# Patient Record
Sex: FEMALE | Race: BLACK OR AFRICAN AMERICAN | NOT HISPANIC OR LATINO | Employment: FULL TIME | URBAN - METROPOLITAN AREA
[De-identification: names, ages, dates, MRNs, and addresses within clinical notes are randomized per-mention and may not be internally consistent; named-entity substitution may affect disease eponyms.]

---

## 2018-10-25 ENCOUNTER — OFFICE VISIT CONVERTED (OUTPATIENT)
Dept: FAMILY MEDICINE CLINIC | Facility: CLINIC | Age: 23
End: 2018-10-25
Attending: NURSE PRACTITIONER

## 2018-11-21 ENCOUNTER — OFFICE VISIT CONVERTED (OUTPATIENT)
Dept: FAMILY MEDICINE CLINIC | Facility: CLINIC | Age: 23
End: 2018-11-21
Attending: NURSE PRACTITIONER

## 2019-08-06 ENCOUNTER — HOSPITAL ENCOUNTER (OUTPATIENT)
Dept: DIABETES SERVICES | Facility: HOSPITAL | Age: 24
Discharge: HOME | End: 2019-08-06
Attending: OBSTETRICS & GYNECOLOGY

## 2019-09-16 ENCOUNTER — HOSPITAL ENCOUNTER (OUTPATIENT)
Dept: LABOR AND DELIVERY | Facility: HOSPITAL | Age: 24
Discharge: HOME | End: 2019-09-16
Attending: OBSTETRICS & GYNECOLOGY

## 2019-10-24 ENCOUNTER — HOSPITAL ENCOUNTER (OUTPATIENT)
Dept: URGENT CARE | Facility: CLINIC | Age: 24
Discharge: HOME | End: 2019-10-24
Attending: EMERGENCY MEDICINE

## 2019-11-11 ENCOUNTER — HOSPITAL ENCOUNTER (OUTPATIENT)
Dept: PERIOP | Facility: HOSPITAL | Age: 24
Setting detail: HOSPITAL OUTPATIENT SURGERY
Discharge: HOME | End: 2019-11-11
Attending: OBSTETRICS & GYNECOLOGY

## 2019-11-11 LAB — HCG UR QL: NEGATIVE

## 2019-11-21 ENCOUNTER — CONVERSION ENCOUNTER (OUTPATIENT)
Dept: FAMILY MEDICINE CLINIC | Facility: CLINIC | Age: 24
End: 2019-11-21

## 2019-11-21 ENCOUNTER — OFFICE VISIT CONVERTED (OUTPATIENT)
Dept: FAMILY MEDICINE CLINIC | Facility: CLINIC | Age: 24
End: 2019-11-21
Attending: NURSE PRACTITIONER

## 2019-11-21 ENCOUNTER — HOSPITAL ENCOUNTER (OUTPATIENT)
Dept: FAMILY MEDICINE CLINIC | Facility: CLINIC | Age: 24
Discharge: HOME | End: 2019-11-21
Attending: NURSE PRACTITIONER

## 2019-11-21 LAB
ALBUMIN SERPL-MCNC: 4.5 G/DL (ref 3.5–5)
ALBUMIN/GLOB SERPL: 1.2 {RATIO} (ref 1.4–2.6)
ALP SERPL-CCNC: 72 U/L (ref 42–98)
ALT SERPL-CCNC: 15 U/L (ref 10–40)
AMYLASE SERPL-CCNC: 86 U/L (ref 30–110)
ANION GAP SERPL CALC-SCNC: 17 MMOL/L (ref 8–19)
AST SERPL-CCNC: 18 U/L (ref 15–50)
BASOPHILS # BLD AUTO: 0.03 10*3/UL (ref 0–0.2)
BASOPHILS NFR BLD AUTO: 0.7 % (ref 0–3)
BILIRUB SERPL-MCNC: 1.04 MG/DL (ref 0.2–1.3)
BUN SERPL-MCNC: 10 MG/DL (ref 5–25)
BUN/CREAT SERPL: 13 {RATIO} (ref 6–20)
CALCIUM SERPL-MCNC: 9.9 MG/DL (ref 8.7–10.4)
CHLORIDE SERPL-SCNC: 101 MMOL/L (ref 99–111)
CONV ABS IMM GRAN: 0.01 10*3/UL (ref 0–0.2)
CONV CO2: 27 MMOL/L (ref 22–32)
CONV IMMATURE GRAN: 0.2 % (ref 0–1.8)
CONV TOTAL PROTEIN: 8.2 G/DL (ref 6.3–8.2)
CREAT UR-MCNC: 0.78 MG/DL (ref 0.5–0.9)
DEPRECATED RDW RBC AUTO: 46.5 FL (ref 36.4–46.3)
EOSINOPHIL # BLD AUTO: 0.1 10*3/UL (ref 0–0.7)
EOSINOPHIL # BLD AUTO: 2.3 % (ref 0–7)
ERYTHROCYTE [DISTWIDTH] IN BLOOD BY AUTOMATED COUNT: 14.4 % (ref 11.7–14.4)
GFR SERPLBLD BASED ON 1.73 SQ M-ARVRAT: >60 ML/MIN/{1.73_M2}
GLOBULIN UR ELPH-MCNC: 3.7 G/DL (ref 2–3.5)
GLUCOSE SERPL-MCNC: 80 MG/DL (ref 65–99)
HCT VFR BLD AUTO: 44.3 % (ref 37–47)
HGB BLD-MCNC: 13.8 G/DL (ref 12–16)
LIPASE SERPL-CCNC: 18 U/L (ref 5–51)
LYMPHOCYTES # BLD AUTO: 1.53 10*3/UL (ref 1–5)
LYMPHOCYTES NFR BLD AUTO: 34.7 % (ref 20–45)
MCH RBC QN AUTO: 27.7 PG (ref 27–31)
MCHC RBC AUTO-ENTMCNC: 31.2 G/DL (ref 33–37)
MCV RBC AUTO: 89 FL (ref 81–99)
MONOCYTES # BLD AUTO: 0.24 10*3/UL (ref 0.2–1.2)
MONOCYTES NFR BLD AUTO: 5.4 % (ref 3–10)
NEUTROPHILS # BLD AUTO: 2.5 10*3/UL (ref 2–8)
NEUTROPHILS NFR BLD AUTO: 56.7 % (ref 30–85)
NRBC CBCN: 0 % (ref 0–0.7)
OSMOLALITY SERPL CALC.SUM OF ELEC: 290 MOSM/KG (ref 273–304)
PLATELET # BLD AUTO: 220 10*3/UL (ref 130–400)
PMV BLD AUTO: 12.2 FL (ref 9.4–12.3)
POTASSIUM SERPL-SCNC: 3.7 MMOL/L (ref 3.5–5.3)
RBC # BLD AUTO: 4.98 10*6/UL (ref 4.2–5.4)
SODIUM SERPL-SCNC: 141 MMOL/L (ref 135–147)
WBC # BLD AUTO: 4.41 10*3/UL (ref 4.8–10.8)

## 2019-12-04 ENCOUNTER — HOSPITAL ENCOUNTER (OUTPATIENT)
Dept: ULTRASOUND IMAGING | Facility: HOSPITAL | Age: 24
Discharge: HOME | End: 2019-12-04
Attending: NURSE PRACTITIONER

## 2019-12-05 ENCOUNTER — OFFICE VISIT CONVERTED (OUTPATIENT)
Dept: SURGERY | Facility: CLINIC | Age: 24
End: 2019-12-05
Attending: SURGERY

## 2019-12-05 ENCOUNTER — HOSPITAL ENCOUNTER (OUTPATIENT)
Dept: URGENT CARE | Facility: CLINIC | Age: 24
Discharge: HOME | End: 2019-12-05
Attending: EMERGENCY MEDICINE

## 2019-12-05 ENCOUNTER — CONVERSION ENCOUNTER (OUTPATIENT)
Dept: SURGERY | Facility: CLINIC | Age: 24
End: 2019-12-05

## 2019-12-13 ENCOUNTER — HOSPITAL ENCOUNTER (OUTPATIENT)
Dept: PERIOP | Facility: HOSPITAL | Age: 24
Setting detail: HOSPITAL OUTPATIENT SURGERY
Discharge: HOME | End: 2019-12-13
Attending: SURGERY

## 2019-12-16 ENCOUNTER — CONVERSION ENCOUNTER (OUTPATIENT)
Dept: UROLOGY | Facility: CLINIC | Age: 24
End: 2019-12-16

## 2019-12-16 ENCOUNTER — OFFICE VISIT CONVERTED (OUTPATIENT)
Dept: UROLOGY | Facility: CLINIC | Age: 24
End: 2019-12-16
Attending: UROLOGY

## 2020-01-02 ENCOUNTER — OFFICE VISIT CONVERTED (OUTPATIENT)
Dept: SURGERY | Facility: CLINIC | Age: 25
End: 2020-01-02
Attending: SURGERY

## 2020-01-02 ENCOUNTER — CONVERSION ENCOUNTER (OUTPATIENT)
Dept: SURGERY | Facility: CLINIC | Age: 25
End: 2020-01-02

## 2020-01-16 ENCOUNTER — CONVERSION ENCOUNTER (OUTPATIENT)
Dept: FAMILY MEDICINE CLINIC | Facility: CLINIC | Age: 25
End: 2020-01-16

## 2020-01-16 ENCOUNTER — OFFICE VISIT CONVERTED (OUTPATIENT)
Dept: FAMILY MEDICINE CLINIC | Facility: CLINIC | Age: 25
End: 2020-01-16
Attending: NURSE PRACTITIONER

## 2021-01-13 ENCOUNTER — HOSPITAL ENCOUNTER (OUTPATIENT)
Dept: FAMILY MEDICINE CLINIC | Facility: CLINIC | Age: 26
Discharge: HOME | End: 2021-01-13
Attending: NURSE PRACTITIONER

## 2021-01-13 LAB
25(OH)D3 SERPL-MCNC: 33.7 NG/ML (ref 30–100)
ALBUMIN SERPL-MCNC: 4.1 G/DL (ref 3.5–5)
ALBUMIN/GLOB SERPL: 1.3 {RATIO} (ref 1.4–2.6)
ALP SERPL-CCNC: 48 U/L (ref 42–98)
ALT SERPL-CCNC: 20 U/L (ref 10–40)
ANION GAP SERPL CALC-SCNC: 13 MMOL/L (ref 8–19)
AST SERPL-CCNC: 19 U/L (ref 15–50)
BASOPHILS # BLD AUTO: 0.02 10*3/UL (ref 0–0.2)
BASOPHILS # BLD: 1 % (ref 0–3)
BASOPHILS NFR BLD AUTO: 0.7 % (ref 0–3)
BILIRUB SERPL-MCNC: 0.87 MG/DL (ref 0.2–1.3)
BUN SERPL-MCNC: 14 MG/DL (ref 5–25)
BUN/CREAT SERPL: 17 {RATIO} (ref 6–20)
CALCIUM SERPL-MCNC: 8.9 MG/DL (ref 8.7–10.4)
CHLORIDE SERPL-SCNC: 106 MMOL/L (ref 99–111)
CHOLEST SERPL-MCNC: 142 MG/DL (ref 107–200)
CHOLEST/HDLC SERPL: 2 {RATIO} (ref 3–6)
CONV ABS BANDS: 0 % (ref 1–5)
CONV ABS IMM GRAN: 0.01 10*3/UL (ref 0–0.2)
CONV ANISOCYTES: SLIGHT
CONV ATYPICAL LYMPHOCYTES: 6 % (ref 0–5)
CONV CO2: 26 MMOL/L (ref 22–32)
CONV HYPOCHROMIA IN BLOOD BY LIGHT MICROSCOPY: SLIGHT
CONV IMMATURE GRAN: 0.4 % (ref 0–1.8)
CONV SEGMENTED NEUTROPHILS: 46 % (ref 45–70)
CONV TOTAL PROTEIN: 7.2 G/DL (ref 6.3–8.2)
CREAT UR-MCNC: 0.83 MG/DL (ref 0.5–0.9)
DEPRECATED RDW RBC AUTO: 44 FL (ref 36.4–46.3)
EOSINOPHIL # BLD AUTO: 0.18 10*3/UL (ref 0–0.7)
EOSINOPHIL # BLD AUTO: 6.3 % (ref 0–7)
EOSINOPHIL NFR BLD AUTO: 3 % (ref 0–7)
ERYTHROCYTE [DISTWIDTH] IN BLOOD BY AUTOMATED COUNT: 13.4 % (ref 11.7–14.4)
GFR SERPLBLD BASED ON 1.73 SQ M-ARVRAT: >60 ML/MIN/{1.73_M2}
GLOBULIN UR ELPH-MCNC: 3.1 G/DL (ref 2–3.5)
GLUCOSE SERPL-MCNC: 81 MG/DL (ref 65–99)
HCT VFR BLD AUTO: 40.1 % (ref 37–47)
HDLC SERPL-MCNC: 71 MG/DL (ref 40–60)
HGB BLD-MCNC: 12.8 G/DL (ref 12–16)
LARGE PLATELETS: SLIGHT
LDLC SERPL CALC-MCNC: 62 MG/DL (ref 70–100)
LYMPHOCYTES # BLD AUTO: 1.09 10*3/UL (ref 1–5)
LYMPHOCYTES NFR BLD AUTO: 38.4 % (ref 20–45)
MCH RBC QN AUTO: 28.4 PG (ref 27–31)
MCHC RBC AUTO-ENTMCNC: 31.9 G/DL (ref 33–37)
MCV RBC AUTO: 89.1 FL (ref 81–99)
MONOCYTES # BLD AUTO: 0.16 10*3/UL (ref 0.2–1.2)
MONOCYTES NFR BLD AUTO: 5.6 % (ref 3–10)
MONOCYTES NFR BLD MANUAL: 11 % (ref 3–10)
NEUTROPHILS # BLD AUTO: 1.38 10*3/UL (ref 2–8)
NEUTROPHILS NFR BLD AUTO: 48.6 % (ref 30–85)
NRBC CBCN: 0 % (ref 0–0.7)
NUC CELL # PRT MANUAL: 0 /100{WBCS}
OSMOLALITY SERPL CALC.SUM OF ELEC: 292 MOSM/KG (ref 273–304)
OVALOCYTES BLD QL SMEAR: ABNORMAL
PLAT MORPH BLD: NORMAL
PLATELET # BLD AUTO: 150 10*3/UL (ref 130–400)
PMV BLD AUTO: 11.6 FL (ref 9.4–12.3)
POTASSIUM SERPL-SCNC: 4.3 MMOL/L (ref 3.5–5.3)
RBC # BLD AUTO: 4.5 10*6/UL (ref 4.2–5.4)
SMALL PLATELETS BLD QL SMEAR: ADEQUATE
SODIUM SERPL-SCNC: 141 MMOL/L (ref 135–147)
SPHEROCYTES BLD QL SMEAR: ABNORMAL
T4 FREE SERPL-MCNC: 1 NG/DL (ref 0.9–1.8)
TRIGL SERPL-MCNC: 45 MG/DL (ref 40–150)
TSH SERPL-ACNC: 1.49 M[IU]/L (ref 0.27–4.2)
VARIANT LYMPHS NFR BLD MANUAL: 33 % (ref 20–45)
VLDLC SERPL-MCNC: 9 MG/DL (ref 5–37)
WBC # BLD AUTO: 2.84 10*3/UL (ref 4.8–10.8)

## 2021-01-18 ENCOUNTER — OFFICE VISIT CONVERTED (OUTPATIENT)
Dept: FAMILY MEDICINE CLINIC | Facility: CLINIC | Age: 26
End: 2021-01-18
Attending: NURSE PRACTITIONER

## 2021-01-18 ENCOUNTER — HOSPITAL ENCOUNTER (OUTPATIENT)
Dept: FAMILY MEDICINE CLINIC | Facility: CLINIC | Age: 26
Discharge: HOME | End: 2021-01-18
Attending: NURSE PRACTITIONER

## 2021-01-18 LAB
BASOPHILS # BLD AUTO: 0.02 10*3/UL (ref 0–0.2)
BASOPHILS NFR BLD AUTO: 0.7 % (ref 0–3)
CONV ABS IMM GRAN: 0 10*3/UL (ref 0–0.2)
CONV IMMATURE GRAN: 0 % (ref 0–1.8)
DEPRECATED RDW RBC AUTO: 44 FL (ref 36.4–46.3)
EOSINOPHIL # BLD AUTO: 0.18 10*3/UL (ref 0–0.7)
EOSINOPHIL # BLD AUTO: 6.5 % (ref 0–7)
ERYTHROCYTE [DISTWIDTH] IN BLOOD BY AUTOMATED COUNT: 13.4 % (ref 11.7–14.4)
HCT VFR BLD AUTO: 41.4 % (ref 37–47)
HGB BLD-MCNC: 13.3 G/DL (ref 12–16)
LYMPHOCYTES # BLD AUTO: 1.12 10*3/UL (ref 1–5)
LYMPHOCYTES NFR BLD AUTO: 40.6 % (ref 20–45)
MCH RBC QN AUTO: 28.6 PG (ref 27–31)
MCHC RBC AUTO-ENTMCNC: 32.1 G/DL (ref 33–37)
MCV RBC AUTO: 89 FL (ref 81–99)
MONOCYTES # BLD AUTO: 0.19 10*3/UL (ref 0.2–1.2)
MONOCYTES NFR BLD AUTO: 6.9 % (ref 3–10)
NEUTROPHILS # BLD AUTO: 1.25 10*3/UL (ref 2–8)
NEUTROPHILS NFR BLD AUTO: 45.3 % (ref 30–85)
NRBC CBCN: 0 % (ref 0–0.7)
PLATELET # BLD AUTO: 170 10*3/UL (ref 130–400)
PMV BLD AUTO: 11.1 FL (ref 9.4–12.3)
RBC # BLD AUTO: 4.65 10*6/UL (ref 4.2–5.4)
WBC # BLD AUTO: 2.76 10*3/UL (ref 4.8–10.8)

## 2021-02-01 ENCOUNTER — OFFICE VISIT CONVERTED (OUTPATIENT)
Dept: ONCOLOGY | Facility: HOSPITAL | Age: 26
End: 2021-02-01
Attending: INTERNAL MEDICINE

## 2021-02-01 ENCOUNTER — HOSPITAL ENCOUNTER (OUTPATIENT)
Dept: ONCOLOGY | Facility: HOSPITAL | Age: 26
Discharge: HOME | End: 2021-02-01
Attending: INTERNAL MEDICINE

## 2021-02-01 LAB
BASOPHILS # BLD AUTO: 0.02 10*3/UL (ref 0–0.2)
BASOPHILS NFR BLD AUTO: 0.6 % (ref 0–3)
CONV ABS IMM GRAN: 0 10*3/UL (ref 0–0.2)
CONV IMMATURE GRAN: 0 % (ref 0–1.8)
DEPRECATED RDW RBC AUTO: 42.1 FL (ref 36.4–46.3)
EOSINOPHIL # BLD AUTO: 0.25 10*3/UL (ref 0–0.7)
EOSINOPHIL # BLD AUTO: 7.2 % (ref 0–7)
ERYTHROCYTE [DISTWIDTH] IN BLOOD BY AUTOMATED COUNT: 13.1 % (ref 11.7–14.4)
HCT VFR BLD AUTO: 39.4 % (ref 37–47)
HGB BLD-MCNC: 12.7 G/DL (ref 12–16)
LYMPHOCYTES # BLD AUTO: 1.03 10*3/UL (ref 1–5)
LYMPHOCYTES NFR BLD AUTO: 29.8 % (ref 20–45)
MCH RBC QN AUTO: 28.3 PG (ref 27–31)
MCHC RBC AUTO-ENTMCNC: 32.2 G/DL (ref 33–37)
MCV RBC AUTO: 87.9 FL (ref 81–99)
MONOCYTES # BLD AUTO: 0.24 10*3/UL (ref 0.2–1.2)
MONOCYTES NFR BLD AUTO: 6.9 % (ref 3–10)
NEUTROPHILS # BLD AUTO: 1.92 10*3/UL (ref 2–8)
NEUTROPHILS NFR BLD AUTO: 55.5 % (ref 30–85)
NRBC CBCN: 0 % (ref 0–0.7)
PLATELET # BLD AUTO: 176 10*3/UL (ref 130–400)
PMV BLD AUTO: 11.2 FL (ref 9.4–12.3)
RBC # BLD AUTO: 4.48 10*6/UL (ref 4.2–5.4)
WBC # BLD AUTO: 3.46 10*3/UL (ref 4.8–10.8)

## 2021-05-14 VITALS
BODY MASS INDEX: 27.13 KG/M2 | HEART RATE: 93 BPM | DIASTOLIC BLOOD PRESSURE: 72 MMHG | OXYGEN SATURATION: 100 % | WEIGHT: 153.12 LBS | SYSTOLIC BLOOD PRESSURE: 110 MMHG | TEMPERATURE: 98.2 F | HEIGHT: 63 IN

## 2021-05-14 NOTE — PROGRESS NOTES
Progress Note      Patient Name: Jhoana Garcia   Patient ID: [id number]   Sex: Female   YOB: 1995    Primary Care Provider: Yi MOSLEY   Referring Provider: Yi MOSLEY    Visit Date: January 18, 2021    Provider: DIMITRI Nelson   Location: [facility]   Location Address: [address] [place] [address], [address] [place] [address]  [id number]   Location Phone: [phone]          Chief Complaint  · Annual physical exam      History Of Present Illness  Jhoana Garcia is a 25 year old /Black,  or  female who presents for evaluation and treatment of:      Patient is a 25-year-old female who comes in for follow-up after she got lab work done.  She has no complaints, blood pressure is good today at 110/72 she denies any headache, chest pain or change in vision.  She states overall she is feeling well, she states it did just been over a year since she her last labs and wanted to make sure things looked okay.  Overall her labs looked well I did discuss with patient that her white count is 2.8 we like it below 4.8, will repeat CBC today discussed with patient if remains low we will likely send over to hematology if it is improved we will recheck in 1 month.  Patient verbalized understanding.  Otherwise no other complaints at this time.       Past Medical History  Disease Name Date Onset Notes   ***No Significant Medical History --  --    Allergy, Unspecified --  --    Birth control counseling --  --    Gallstones --  --          Past Surgical History  Procedure Name Date Notes   Gallbladder removal --  --    Tubal ligation --  --    [place] Tooth Extraction --  --          Allergy List  Allergen Name Date Reaction Notes   amoxicillin --  --  --    PENICILLINS --  --  --    Septra --  --  --        Allergies Reconciled  Family Medical History  Disease Name Relative/Age Notes   Hypertension  --    Diabetes, unspecified type Grandmother  "(maternal)/   Grandmother (maternal)         Social History  Finding Status Start/Stop Quantity Notes   Alcohol Current some day --/-- --  12/19/2016 - 11/21/2016 -    Tobacco Former 20/-- 1-2 qdaily black and milds         Immunizations  NameDate Admin Mfg Trade Name Lot Number Route Inj VIS Given VIS Publication   HPV05/17/2017 MSD Gardasil 9 [id number] IM RD 05/17/2017 12/02/2016   Comments: Patient tolerated inj well and left the office in stable condition.   HPV01/23/2017 MSD Gardasil 9 [id number] IM LD 01/23/2017 03/31/2016   Comments: Pt tolerated injection well. left in stable condition.   HPV11/21/2016 MSD Gardasil 9 [id number] IM  11/21/2016 05/17/2013   Comments: tolerated well   InfluenzaRefused 01/18/2021 NE Not Entered  NE NE     Comments: pt declines         Review of Systems  · Constitutional  o Denies  o : fever, fatigue, weight loss, weight gain  · Eyes  o Denies  o : impaired vision, blurred vision, changes in vision  · HENT  o Denies  o : headaches, vertigo, lightheadedness  · Cardiovascular  o Denies  o : lower extremity edema, claudication, chest pressure, palpitations  · Respiratory  o Denies  o : shortness of breath, wheezing, cough, hemoptysis, dyspnea on exertion  · Gastrointestinal  o Denies  o : nausea, vomiting, diarrhea, constipation, abdominal pain  · Integument  o Denies  o : rash, itching, pigmentation changes  · Musculoskeletal  o Denies  o : joint pain, joint swelling, muscle pain  · Psychiatric  o Denies  o : anxiety, depression, suicidal ideation, homicidal ideation      Vitals  Date Time BP Position Site L\R Cuff Size HR RR TEMP (F) WT  HT  BMI kg/m2 BSA m2 O2 Sat FR L/min FiO2        01/18/2021 09:22 /72 Sitting    93 - R  98.2 153lbs 2oz 5'  3\" 27.12 1.76 100 %            Physical Examination  · Constitutional  o Appearance  o : well-nourished, well developed, in no acute distress  · Eyes  o Conjunctivae  o : conjunctivae normal, no exudates present  o Sclerae  o : sclerae " white  o Pupils and Irises  o : pupils equal and round, and reactive to light and accomodation bilaterally  o Eyelids/Ocular Adnexae  o : extra ocular movements intact  · Respiratory  o Respiratory Effort  o : breathing unlabored, no accessory muscle use  o Inspection of Chest  o : normal appearance, no retractions  o Auscultation of Lungs  o : normal breath sounds bilaterally  · Cardiovascular  o Heart  o :   § Auscultation of Heart  § : regular rate and rhythm, no murmurs, gallops or rubs  o Peripheral Vascular System  o :   § Extremities  § : no edema  · Neurologic  o Mental Status Examination  o :   § Orientation  § : alert and oriented x3  § Speech/Language  § : normal speech pattern  o Cranial Nerves  o : II thru VII intact  o Gait and Station  o : normal gait, able to stand without difficulty  · Psychiatric  o Judgment and Insight  o : judgment and insight intact, judgement for everyday activities and social situations within normal limits, insight intact  o Thought Processes  o : rate of thoughts normal, thought content logical  o Mood and Affect  o : mood normal, affect appropriate              Assessment  · Annual physical exam     V70.0/Z00.00  · Leukopenia     288.50/D72.819  Will recheck labs today if remains low will refer over to hematology. Patient is agreeable treatment plan.      Plan  · Orders  o CBC with Auto Diff [facility] ([id number]) - 288.50/D72.819 - 01/18/2021  o ACO-14: Influenza immunization was not administered for reasons documented [facility] () - - 01/18/2021   pt declines  o ACO-39: Current medications updated and reviewed (0210F, ) - - 01/18/2021  · Medications  o Medications have been Reconciled  o Transition of Care or Provider Policy  · Instructions  o Reviewed health maintenance flowsheet and updated information. Orders were placed and/or patient's response was documented.  o Take all medications as prescribed/directed.  o Patient was educated/instructed on their diagnosis, treatment  and medications prior to discharge from the clinic today.  o Flu vaccine declined.  · Disposition  o Call or Return if symptoms worsen or persist.  o Follow up 1 year  o follow up as needed  o call the office with any questions or concerns            Electronically Signed by: DIMITRI Nelsno -Author on January 18, 2021 09:40:32 AM

## 2021-05-15 VITALS
DIASTOLIC BLOOD PRESSURE: 62 MMHG | WEIGHT: 167 LBS | BODY MASS INDEX: 29.59 KG/M2 | TEMPERATURE: 98.7 F | HEART RATE: 81 BPM | HEIGHT: 63 IN | SYSTOLIC BLOOD PRESSURE: 106 MMHG

## 2021-05-15 VITALS
HEIGHT: 63 IN | DIASTOLIC BLOOD PRESSURE: 58 MMHG | TEMPERATURE: 97.7 F | BODY MASS INDEX: 28.26 KG/M2 | OXYGEN SATURATION: 100 % | SYSTOLIC BLOOD PRESSURE: 114 MMHG | WEIGHT: 159.5 LBS | HEART RATE: 80 BPM

## 2021-05-15 VITALS
SYSTOLIC BLOOD PRESSURE: 118 MMHG | HEART RATE: 91 BPM | DIASTOLIC BLOOD PRESSURE: 62 MMHG | BODY MASS INDEX: 29.95 KG/M2 | WEIGHT: 169 LBS | HEIGHT: 63 IN | TEMPERATURE: 98.8 F | OXYGEN SATURATION: 100 %

## 2021-05-15 VITALS — RESPIRATION RATE: 14 BRPM | HEIGHT: 63 IN | WEIGHT: 167 LBS | BODY MASS INDEX: 29.59 KG/M2

## 2021-05-15 VITALS — BODY MASS INDEX: 29.23 KG/M2 | HEIGHT: 63 IN | RESPIRATION RATE: 14 BRPM | WEIGHT: 165 LBS

## 2021-05-16 VITALS
OXYGEN SATURATION: 99 % | HEIGHT: 63 IN | TEMPERATURE: 98.1 F | DIASTOLIC BLOOD PRESSURE: 60 MMHG | BODY MASS INDEX: 29.06 KG/M2 | SYSTOLIC BLOOD PRESSURE: 108 MMHG | HEART RATE: 75 BPM | WEIGHT: 164 LBS

## 2021-05-16 VITALS
OXYGEN SATURATION: 99 % | BODY MASS INDEX: 29.23 KG/M2 | HEIGHT: 63 IN | DIASTOLIC BLOOD PRESSURE: 64 MMHG | HEART RATE: 80 BPM | WEIGHT: 165 LBS | TEMPERATURE: 98.9 F | SYSTOLIC BLOOD PRESSURE: 118 MMHG

## 2021-05-28 VITALS
BODY MASS INDEX: 26.87 KG/M2 | HEART RATE: 82 BPM | DIASTOLIC BLOOD PRESSURE: 67 MMHG | OXYGEN SATURATION: 100 % | RESPIRATION RATE: 18 BRPM | TEMPERATURE: 97.6 F | WEIGHT: 151.68 LBS | SYSTOLIC BLOOD PRESSURE: 106 MMHG

## 2021-05-28 NOTE — PROGRESS NOTES
Patient: KATHLEEN TORRES     Acct: [id number]     Report: #[id number]  UNIT #: [id number]     : 1995    Encounter Date:2021  PRIMARY CARE: CHEY VIRGEN  ***Signed***  --------------------------------------------------------------------------------------------------------------------  NURSE INTAKE      Visit Type      New Patient Visit            Chief Complaint      ABNORMAL CBC            Referring Provider/Copies To      Primary Care Provider:  CHEY VIRGEN      Copies To:   CHEY VIRGEN            History and Present Illness      HPI - Hematology Interim      25-year-old  female sent for evaluation by PCP for leukopenia      Patient had CBC on 2021 white blood cell count was 2.84 hemoglobin     was 12.8 hematocrit 40.1 platelet count 150 granulocyte count was 1.38 and and s    ome atypical lymphocytes were noted       Lab was repeated on 2021 white blood cell count was 2.76 hemoglobin    13.3 hematocrit 41.4 platelet count of 170 granulocyte count was 1.25 atypical     lymphocytosis was no longer seen      Prior CBCs from 2019 revealed a normal white blood cell count by     history from the patient she reports at the time she had cholecystitis and was     pregnant       Repeat CBC after delivery on  revealed a white blood cell     count of 4.41 slightly low hemoglobin of 13.8 hematocrit of 44.3 and platelet     count of 220      Patient denies any history of frequent infections      Denies any cough or URI symptoms at present      Other pertinent history is that patient tested positive for Covid 19 in 2020.  Patient reports that she was asymptomatic and did not need hos    pitalization            Most Recent Lab Findings      Laboratory Tests      21 15:01            Labs were unremarkable with a normal liver function tests       normal TSH      Normal BUN and creatinine            PAST, FAMILY   Past  Medical History      Past Medical History:  None      Other Hematology History:        ABNORMAL CBC            Past Surgical History      Cholecystectomy            TUBAL            Family History      Family History:  No Family History            Social History      Marital Status:  Single      Lives independently:  Yes      Number of Children:  2            Tobacco Use      Tobacco status:  Current every day smoker      Smoking packs/day:  0.5            Alcohol Use      Alcohol intake:  0-2 drinks per day            Substance Use      Substance use:  Denies use            REVIEW OF SYSTEMS      General:  Denies: Appetite Change, Fatigue, Fever, Night Sweats, Weight Gain,     Weight Loss      Eye:  Denies Blurred Vision, Denies Corrective Lenses, Denies Diplopia, Denies     Vision Changes      ENT:  Denies Headache, Denies Hearing Loss, Denies Hoarseness, Denies Sore     Throat      Cardiovascular:  Denies Chest Pain, Denies Palpitations      Respiratory:  Denies: Cough, Coughing Blood, Productive Cough, Shortness of Air,    Wheezing      Gastrointestinal:  Denies Bloody Stools, Denies Constipation, Denies Diarrhea,     Denies Nausea/Vomiting, Denies Problem Swallowing, Denies Unable to Control     Bowels      Genitourinary:  Denies Blood in Urine, Denies Incontinence, Denies Painful     Urination      Musculoskeletal:  Denies Back Pain, Denies Muscle Pain, Denies Painful Joints      Integumentary:  Denies Itching, Denies Lesions, Denies Rash      Neurologic:  Denies Dizziness, Denies Numbness\Tingling, Denies Seizures      Psychiatric:  Denies Anxiety, Denies Depression      Endocrine:  Denies Cold Intolerance, Denies Heat Intolerance      Hematologic/Lymphatic:  Denies Bruising, Denies Bleeding, Denies Enlarged Lymph     Nodes      Reproductive:  Denies: Menopause, Heavy Periods, Pregnant, Still Menstruating            VITAL SIGNS AND SCORES      Vitals      Weight 151 lbs 10.824 oz / 68.8 kg      Temperature  97.6 F / 36.44 C - Temporal      Pulse 82      Respirations 18      Blood Pressure 106/67 Sitting, Left Arm      Pulse Oximetry 100%, ROOM AIR            Pain Score      Experiencing any pain?:  No      Pain Scale Used:  Numerical      Pain Intensity:  0            Fatigue Score      Experiencing any fatigue?:  No      Fatigue (0-10 scale):  0 (none)            EXAM      General Appearance:  Positive for: Alert, Oriented x3, Cooperative      Eye:  Positive for: Anicteric Sclerae      HEENT:  Positive for: Oropharynx clear      Respiratory:  Positive for: CTAB      Abdomen/Gastro:  Positive for: Normal Active Bowel Sounds      Cardiovascular:  Positive for: RRR      Skin:  Negative for: Rash      Psychiatric:  Positive for: AAO X 3      Musculoskeletal:  Positive for: Full ROM Lower Extremety      Lower Extremities:  Negative for: Edema      Lymphatic:  Negative for: Axillary            PREVENTION      Hx Influenza Vaccination:  No      2 or More Falls in Past Year?:  No      Fall Past Year with Injury?:  No      Hx Pneumococcal Vaccination:  No      Encouraged to follow-up with:  PCP regarding preventative exams.      Chart initiated by      MICHAEL BOLAÑOS            ALLERGY/MEDS      Allergies      Coded Allergies:             AMOXICILLIN (Verified  Allergy, Unknown, RASH, 2/1/21)           SULFAMETHOXAZOLE (Verified  Allergy, Unknown, RASH, 2/1/21)           TRIMETHOPRIM (Verified  Allergy, Unknown, RASH, 2/1/21)            Medications      Medications Reviewed:  No Changes made to meds            IMPRESSION/PLAN      Impression      Leukopenia            Diagnosis      Notes      Discontinued Medications      * HYDROcodone-Acetaminophen 5-325 Mg 1 EACH TABLET: 1-2 TAB PO Q4H PRN PAIN #20      * Docusate Sodium (Colace) 100 MG CAPSULE: 100 MG PO BID PRN CONSTIPATION #10      * ONDANSETRON ODT 4 MG TAB.RAPDIS: 4 MG PO Q6H PRN NAUSEA #10      New Diagnostics      * CBC With Auto Diff, Routine            Plan       Leukopenia      Likely leukopenia is related to benign leukopenia often seen in presence of      descent       Patient generally feels well without any symptoms of fatigue or weight loss and     without any symptoms of infection      There is no evidence that this is a primary bone marrow process given normal     differential on the CBC as well as her normal hemoglobin and platelet count and     a bone marrow aspirate and biopsy is not indicated at this time      Her absolute neutrophil count is well over 1000 and thus patient is not at risk     for any infections      Her prior CBCs from September 2019 coincide with a time when the patient had     cholecystitis and white count was elevated but still in the normal range      Subsequent CBC from November 2019 revealed a low white blood cell count which     may actually be closer to the patient's normal range      I recommend monitoring her CBCs.      I will repeat her CBC today and she should have a repeat CBC in approximately 3     -6months to monitor her trend      If progressive decline in her white blood cell count would be concerned about a     clonal T-cell leukemia such as large granular leukemia and would require further    evaluation      Stable blood counts however would indicate that she  most likely has benign     leukopenia            Patient Education      Patient Education Provided:  Yes            Electronically signed by Pamela Hopper  02/01/2021 16:00       Disclaimer: Converted document may not contain table formatting or lab diagrams. Please see [employer] [facility] for the authenticated document.

## 2022-03-22 ENCOUNTER — OFFICE VISIT (OUTPATIENT)
Dept: FAMILY MEDICINE CLINIC | Facility: CLINIC | Age: 27
End: 2022-03-22

## 2022-03-22 VITALS
TEMPERATURE: 97.3 F | HEIGHT: 64 IN | DIASTOLIC BLOOD PRESSURE: 68 MMHG | SYSTOLIC BLOOD PRESSURE: 98 MMHG | BODY MASS INDEX: 26.98 KG/M2 | HEART RATE: 83 BPM | WEIGHT: 158 LBS | OXYGEN SATURATION: 98 %

## 2022-03-22 DIAGNOSIS — L73.2 HIDRADENITIS SUPPURATIVA: Primary | ICD-10-CM

## 2022-03-22 PROCEDURE — 99213 OFFICE O/P EST LOW 20 MIN: CPT | Performed by: NURSE PRACTITIONER

## 2022-03-22 RX ORDER — DOXYCYCLINE 100 MG/1
100 CAPSULE ORAL 2 TIMES DAILY
Qty: 28 CAPSULE | Refills: 0 | Status: SHIPPED | OUTPATIENT
Start: 2022-03-22 | End: 2022-04-05

## 2022-03-22 NOTE — PROGRESS NOTES
"Chief Complaint  Acne    Subjective          Jhoana Garcia presents to [facility]  History of Present Illness  Is in today for an acute visit with concerns of possible hydroenteritis or acne in the groin.  Patient states since having her last child she is developed type of acne or folliculitis in the pubic area.  She constantly picks at it.  There is scarring and irritation.  Denies drainage, fever, and chills.  This has been occurring for the past 2 years.    Objective   Vital Signs:   BP 98/68   Pulse 83   Temp 97.3 °F (36.3 °C)   Ht 162.6 cm (64\")   Wt 71.7 kg (158 lb)   SpO2 98%   BMI 27.12 kg/m²            Physical Exam  Vitals reviewed.   Constitutional:       Appearance: Normal appearance. She is well-developed.   HENT:      Head: Normocephalic and atraumatic.      Right Ear: External ear normal.      Left Ear: External ear normal.      Mouth/Throat:      Pharynx: No oropharyngeal exudate.   Eyes:      Conjunctiva/sclera: Conjunctivae normal.      Pupils: Pupils are equal, round, and reactive to light.   Cardiovascular:      Rate and Rhythm: Normal rate and regular rhythm.      Heart sounds: No murmur heard.    No friction rub. No gallop.   Pulmonary:      Effort: Pulmonary effort is normal.      Breath sounds: Normal breath sounds. No wheezing or rhonchi.   Abdominal:      General: Bowel sounds are normal. There is no distension.      Palpations: Abdomen is soft.      Tenderness: There is no abdominal tenderness.   Skin:     General: Skin is warm and dry.      Findings: Acne (Scarring, appearance of hidradenitis suppurativa) present.          Neurological:      Mental Status: She is alert and oriented to person, place, and time.        Result Review :                 Assessment and Plan    Diagnoses and all orders for this visit:    1. Hidradenitis suppurativa (Primary)  -     doxycycline (MONODOX) 100 MG capsule; Take 1 capsule by mouth 2 (Two) Times a Day for 14 " days.  Dispense: 28 capsule; Refill: 0    Will start doxycycline 100 mg twice daily for the next 2 weeks.  Follow-up in 2 weeks    Follow Up   Return in about 2 weeks (around 4/5/2022), or if symptoms worsen or fail to improve, for Next scheduled follow up.  Patient was given instructions and counseling regarding her condition or for health maintenance advice. Please see specific information pulled into the AVS if appropriate.

## 2022-04-08 ENCOUNTER — OFFICE VISIT (OUTPATIENT)
Dept: FAMILY MEDICINE CLINIC | Facility: CLINIC | Age: 27
End: 2022-04-08

## 2022-04-08 VITALS
SYSTOLIC BLOOD PRESSURE: 112 MMHG | HEIGHT: 64 IN | OXYGEN SATURATION: 100 % | WEIGHT: 160 LBS | DIASTOLIC BLOOD PRESSURE: 72 MMHG | BODY MASS INDEX: 27.31 KG/M2 | HEART RATE: 67 BPM | TEMPERATURE: 97.5 F

## 2022-04-08 DIAGNOSIS — L73.2 HIDRADENITIS SUPPURATIVA: Primary | ICD-10-CM

## 2022-04-08 PROBLEM — K80.20 GALLSTONES: Status: ACTIVE | Noted: 2022-04-08

## 2022-04-08 PROBLEM — Z78.9 USES CONTRACEPTION: Status: ACTIVE | Noted: 2022-04-08

## 2022-04-08 PROCEDURE — 99213 OFFICE O/P EST LOW 20 MIN: CPT | Performed by: NURSE PRACTITIONER

## 2022-04-08 RX ORDER — DOXYCYCLINE 100 MG/1
100 CAPSULE ORAL DAILY
Qty: 30 CAPSULE | Refills: 0 | Status: SHIPPED | OUTPATIENT
Start: 2022-04-08 | End: 2022-05-11

## 2022-04-08 NOTE — PROGRESS NOTES
"Chief Complaint  hidradenitis suppurativa     Subjective          Jhoana Garcia presents to [facility]  History of Present Illness  Presents today for 2-week follow-up on hidradenitis suppurativa.  She has been taking doxycycline 100 mg twice daily for the past 2 weeks.  She noticed some mild improvement.  Scarring in the groin area.  No new nodules.  No cystlike or pustules.  No drainage.  Denies fever and chills.    Objective   Vital Signs:   /72   Pulse 67   Temp 97.5 °F (36.4 °C)   Ht 162.6 cm (64\")   Wt 72.6 kg (160 lb)   SpO2 100%   BMI 27.46 kg/m²            Physical Exam  Vitals reviewed.   Constitutional:       Appearance: Normal appearance. She is well-developed.   HENT:      Head: Normocephalic and atraumatic.      Right Ear: External ear normal.      Left Ear: External ear normal.      Mouth/Throat:      Pharynx: No oropharyngeal exudate.   Eyes:      Conjunctiva/sclera: Conjunctivae normal.      Pupils: Pupils are equal, round, and reactive to light.   Cardiovascular:      Rate and Rhythm: Normal rate and regular rhythm.      Heart sounds: No murmur heard.    No friction rub. No gallop.   Pulmonary:      Effort: Pulmonary effort is normal.      Breath sounds: Normal breath sounds. No wheezing or rhonchi.   Skin:     General: Skin is warm and dry.      Comments: Hidradenitis suppurativa groin   Neurological:      Mental Status: She is alert and oriented to person, place, and time.   Psychiatric:         Mood and Affect: Affect normal.        Result Review :                 Assessment and Plan    Diagnoses and all orders for this visit:    1. Hidradenitis suppurativa (Primary)  -     doxycycline (MONODOX) 100 MG capsule; Take 1 capsule by mouth Daily.  Dispense: 30 capsule; Refill: 0    Decrease doxycycline to 100 mg once daily for the next month.    Follow Up   Return in about 4 weeks (around 5/6/2022) for Next scheduled follow up.  Patient was given " instructions and counseling regarding her condition or for health maintenance advice. Please see specific information pulled into the AVS if appropriate.

## 2022-05-06 ENCOUNTER — TELEPHONE (OUTPATIENT)
Dept: FAMILY MEDICINE CLINIC | Facility: CLINIC | Age: 27
End: 2022-05-06

## 2022-05-06 NOTE — TELEPHONE ENCOUNTER
Caller: Jhoana Garcia    Relationship to patient: Self    Best call back number: [phone]    Patient is needing: PATIENT CALLED STATING SHE WOULD LIKE TO KNOW IF HER PCP CAN CALL HER IN THE CREAM OF DOXYCYCLINE. THE PATIENT STATED THE PILL FORM IS CAUSING HER TO GET RASHES. PATIENT WOULD LIKE A CALL BACK TO LET HER KNOW THIS HAS BEEN SENT TO THE PHARMACY PLEASE ADVISE THANK YOU.         [facility] - [place], [place] - [address] [facility] - [phone] [facility] [phone]   [phone]

## 2022-05-11 ENCOUNTER — OFFICE VISIT (OUTPATIENT)
Dept: FAMILY MEDICINE CLINIC | Facility: CLINIC | Age: 27
End: 2022-05-11

## 2022-05-11 VITALS
BODY MASS INDEX: 27.93 KG/M2 | OXYGEN SATURATION: 100 % | WEIGHT: 163.6 LBS | SYSTOLIC BLOOD PRESSURE: 104 MMHG | DIASTOLIC BLOOD PRESSURE: 60 MMHG | HEIGHT: 64 IN | HEART RATE: 75 BPM | TEMPERATURE: 97.5 F

## 2022-05-11 DIAGNOSIS — E66.3 OVERWEIGHT WITH BODY MASS INDEX (BMI) OF 28 TO 28.9 IN ADULT: Primary | ICD-10-CM

## 2022-05-11 DIAGNOSIS — L73.2 HIDRADENITIS SUPPURATIVA: ICD-10-CM

## 2022-05-11 DIAGNOSIS — R21 RASH: ICD-10-CM

## 2022-05-11 DIAGNOSIS — Z72.0 VAPES NICOTINE CONTAINING SUBSTANCE: ICD-10-CM

## 2022-05-11 DIAGNOSIS — T36.95XA ANTIBIOTIC REACTION, INITIAL ENCOUNTER: ICD-10-CM

## 2022-05-11 DIAGNOSIS — Z51.81 MEDICATION MONITORING ENCOUNTER: ICD-10-CM

## 2022-05-11 PROCEDURE — 80305 DRUG TEST PRSMV DIR OPT OBS: CPT | Performed by: NURSE PRACTITIONER

## 2022-05-11 PROCEDURE — 93000 ELECTROCARDIOGRAM COMPLETE: CPT | Performed by: NURSE PRACTITIONER

## 2022-05-11 PROCEDURE — 99214 OFFICE O/P EST MOD 30 MIN: CPT | Performed by: NURSE PRACTITIONER

## 2022-05-11 RX ORDER — PHENTERMINE HYDROCHLORIDE 37.5 MG/1
37.5 CAPSULE ORAL EVERY MORNING
Qty: 30 CAPSULE | Refills: 2 | Status: SHIPPED | OUTPATIENT
Start: 2022-05-11 | End: 2023-01-27

## 2022-05-11 RX ORDER — CLINDAMYCIN PHOSPHATE 11.9 MG/ML
SOLUTION TOPICAL 2 TIMES DAILY
Qty: 30 ML | Refills: 1 | Status: SHIPPED | OUTPATIENT
Start: 2022-05-11 | End: 2022-06-13

## 2022-05-11 NOTE — ASSESSMENT & PLAN NOTE
Patient's (Body mass index is 28.08 kg/m².) indicates that they are overweight with health conditions that include none . Weight is newly identified. BMI is is above average; BMI management plan is completed. We discussed portion control, increasing exercise and pharmacologic options including phenteramine.

## 2022-05-11 NOTE — PROGRESS NOTES
"Chief Complaint  Rash (Antibiotics gave patient a rash ) overweight weight gain    Subjective          Jhoana Garcia presents to [facility]  History of Present Illness  Presents today for an acute visit for rash from taking doxycycline.  She had taken doxycycline 20 mg twice daily x1 month and tolerated medication fine.  Medication was decreased to 200 mg daily for the next month for her hidradenitis suppurativa.  Approximate week ago she started being itching around her neck and down in between her breasts.  Since stopping the doxycycline her symptoms have improved.  She does have some 100 Tritus nodules on her groin area.    Patient states she has been having difficulty losing weight.  She has gained 10 pounds in the last 6 months.  She has since stopped drinking sodas and eating fast food.  She exercises occasionally.  Denies chest pain, syncope, palpitations.  Currently vapes.  Former smoker.    Social History     Socioeconomic History   • Marital status: Single   Tobacco Use   • Smoking status: Former Smoker     Packs/day: 0.25     Years: 5.00     Pack years: 1.25     Quit date: 2021     Years since quittin.8   • Smokeless tobacco: Never Used   Vaping Use   • Vaping Use: Every day   Substance and Sexual Activity   • Alcohol use: Never   • Drug use: Never       Objective   Vital Signs:  /60   Pulse 75   Temp 97.5 °F (36.4 °C)   Ht 162.6 cm (64\")   Wt 74.2 kg (163 lb 9.6 oz)   SpO2 100%   BMI 28.08 kg/m²           Physical Exam  Vitals reviewed.   Constitutional:       Appearance: Normal appearance. She is well-developed.   HENT:      Head: Normocephalic and atraumatic.      Right Ear: External ear normal.      Left Ear: External ear normal.      Mouth/Throat:      Pharynx: No oropharyngeal exudate.   Eyes:      Conjunctiva/sclera: Conjunctivae normal.      Pupils: Pupils are equal, round, and reactive to light.   Cardiovascular:      Rate and Rhythm: Normal " rate and regular rhythm.      Heart sounds: No murmur heard.    No friction rub. No gallop.   Pulmonary:      Effort: Pulmonary effort is normal.      Breath sounds: Normal breath sounds. No wheezing or rhonchi.   Abdominal:      General: Bowel sounds are normal. There is no distension.      Palpations: Abdomen is soft.      Tenderness: There is no abdominal tenderness.   Skin:     General: Skin is warm and dry.   Neurological:      Mental Status: She is alert and oriented to person, place, and time.   Psychiatric:         Mood and Affect: Mood and affect normal.         Behavior: Behavior normal.         Thought Content: Thought content normal.         Judgment: Judgment normal.        Result Review :            ECG 12 Lead    Date/Time: 5/11/2022 1:28 PM  Performed by: Jack Souza APRN  Authorized by: Jack Souza APRN   Previous ECG: no previous ECG available  Rhythm: sinus rhythm  Rate: normal  BPM: 73  Conduction: conduction normal  ST Segments: ST segments normal  T Waves: T waves normal  QRS axis: normal    Clinical impression: normal ECG  Comments: Sinus rhythm, rate 73, , QRST 89, , QTcB 433, axis P- 6, QRS 69, T 49              Assessment and Plan    Diagnoses and all orders for this visit:    1. Overweight with body mass index (BMI) of 28 to 28.9 in adult (Primary)  Assessment & Plan:  Patient's (Body mass index is 28.08 kg/m².) indicates that they are overweight with health conditions that include none . Weight is newly identified. BMI is is above average; BMI management plan is completed. We discussed portion control, increasing exercise and pharmacologic options including phenteramine.     Orders:  -     ECG 12 Lead  -     phentermine 37.5 MG capsule; Take 1 capsule by mouth Every Morning.  Dispense: 30 capsule; Refill: 2    2. Vapes nicotine containing substance    3. Medication monitoring encounter  -     ECG 12 Lead  -     POC Urine Drug Screen [facility]    4. Rash    5.  Antibiotic reaction, initial encounter    6. Hidradenitis suppurativa    Other orders  -     clindamycin (CLEOCIN T) 1 % external solution; Apply  topically to the appropriate area as directed 2 (Two) Times a Day.  Dispense: 30 mL; Refill: 1    For the antibiotic reaction and rash may take Benadryl as needed.  Symptoms are improving.  Will prescribe clindamycin external solution to be applied twice daily as needed for the hidradenitis suppurativa.    EKG today in office.  Discussed healthy eating exercising and weight loss.  We will start phentermine 37.5 mg once daily before breakfast.  Ensure to drink plenty of water.  Monitor for signs symptoms.  Russell reviewed.  UDS is appropriate.    Risk and benefits of stimulant medications were discussed with the patient today including risk of diversion and abuse.  Discussed need for routine monitoring and need to adjust medication or discontinue as appropriate.       Follow Up   Return in about 4 weeks (around 6/8/2022), or if symptoms worsen or fail to improve, for Next scheduled follow up.  Patient was given instructions and counseling regarding her condition or for health maintenance advice. Please see specific information pulled into the AVS if appropriate.

## 2022-06-09 ENCOUNTER — OFFICE VISIT (OUTPATIENT)
Dept: FAMILY MEDICINE CLINIC | Facility: CLINIC | Age: 27
End: 2022-06-09

## 2022-06-09 VITALS
TEMPERATURE: 98.7 F | DIASTOLIC BLOOD PRESSURE: 78 MMHG | SYSTOLIC BLOOD PRESSURE: 110 MMHG | HEIGHT: 64 IN | OXYGEN SATURATION: 98 % | WEIGHT: 157 LBS | HEART RATE: 88 BPM | BODY MASS INDEX: 26.8 KG/M2

## 2022-06-09 DIAGNOSIS — E66.3 OVERWEIGHT WITH BODY MASS INDEX (BMI) OF 26 TO 26.9 IN ADULT: ICD-10-CM

## 2022-06-09 DIAGNOSIS — Z01.419 ENCOUNTER FOR WELL WOMAN EXAM WITH ROUTINE GYNECOLOGICAL EXAM: Primary | ICD-10-CM

## 2022-06-09 DIAGNOSIS — E55.9 VITAMIN D DEFICIENCY: ICD-10-CM

## 2022-06-09 LAB
25(OH)D3 SERPL-MCNC: 29.8 NG/ML (ref 30–100)
ALBUMIN SERPL-MCNC: 4.5 G/DL (ref 3.5–5.2)
ALBUMIN/GLOB SERPL: 1.7 G/DL
ALP SERPL-CCNC: 47 U/L (ref 39–117)
ALT SERPL W P-5'-P-CCNC: 14 U/L (ref 1–33)
ANION GAP SERPL CALCULATED.3IONS-SCNC: 8.7 MMOL/L (ref 5–15)
AST SERPL-CCNC: 19 U/L (ref 1–32)
BASOPHILS # BLD AUTO: 0.02 10*3/MM3 (ref 0–0.2)
BASOPHILS NFR BLD AUTO: 0.9 % (ref 0–1.5)
BILIRUB SERPL-MCNC: 0.5 MG/DL (ref 0–1.2)
BUN SERPL-MCNC: 13 MG/DL (ref 6–20)
BUN/CREAT SERPL: 18.1 (ref 7–25)
C TRACH RRNA CVX QL NAA+PROBE: NOT DETECTED
CALCIUM SPEC-SCNC: 8.9 MG/DL (ref 8.6–10.5)
CHLORIDE SERPL-SCNC: 103 MMOL/L (ref 98–107)
CHOLEST SERPL-MCNC: 164 MG/DL (ref 0–200)
CO2 SERPL-SCNC: 25.3 MMOL/L (ref 22–29)
CREAT SERPL-MCNC: 0.72 MG/DL (ref 0.57–1)
DEPRECATED RDW RBC AUTO: 40.2 FL (ref 37–54)
EGFRCR SERPLBLD CKD-EPI 2021: 118.4 ML/MIN/1.73
EOSINOPHIL # BLD AUTO: 0.12 10*3/MM3 (ref 0–0.4)
EOSINOPHIL NFR BLD AUTO: 5.2 % (ref 0.3–6.2)
ERYTHROCYTE [DISTWIDTH] IN BLOOD BY AUTOMATED COUNT: 12.9 % (ref 12.3–15.4)
GLOBULIN UR ELPH-MCNC: 2.7 GM/DL
GLUCOSE SERPL-MCNC: 80 MG/DL (ref 65–99)
HCT VFR BLD AUTO: 40.1 % (ref 34–46.6)
HDLC SERPL-MCNC: 71 MG/DL (ref 40–60)
HGB BLD-MCNC: 12.9 G/DL (ref 12–15.9)
IMM GRANULOCYTES # BLD AUTO: 0.01 10*3/MM3 (ref 0–0.05)
IMM GRANULOCYTES NFR BLD AUTO: 0.4 % (ref 0–0.5)
LDLC SERPL CALC-MCNC: 82 MG/DL (ref 0–100)
LDLC/HDLC SERPL: 1.15 {RATIO}
LYMPHOCYTES # BLD AUTO: 1.13 10*3/MM3 (ref 0.7–3.1)
LYMPHOCYTES NFR BLD AUTO: 49.1 % (ref 19.6–45.3)
MCH RBC QN AUTO: 28 PG (ref 26.6–33)
MCHC RBC AUTO-ENTMCNC: 32.2 G/DL (ref 31.5–35.7)
MCV RBC AUTO: 87.2 FL (ref 79–97)
MONOCYTES # BLD AUTO: 0.23 10*3/MM3 (ref 0.1–0.9)
MONOCYTES NFR BLD AUTO: 10 % (ref 5–12)
N GONORRHOEA RRNA SPEC QL NAA+PROBE: NOT DETECTED
NEUTROPHILS NFR BLD AUTO: 0.79 10*3/MM3 (ref 1.7–7)
NEUTROPHILS NFR BLD AUTO: 34.4 % (ref 42.7–76)
NRBC BLD AUTO-RTO: 0 /100 WBC (ref 0–0.2)
PLATELET # BLD AUTO: 153 10*3/MM3 (ref 140–450)
PMV BLD AUTO: 11.2 FL (ref 6–12)
POTASSIUM SERPL-SCNC: 4.2 MMOL/L (ref 3.5–5.2)
PROT SERPL-MCNC: 7.2 G/DL (ref 6–8.5)
RBC # BLD AUTO: 4.6 10*6/MM3 (ref 3.77–5.28)
SODIUM SERPL-SCNC: 137 MMOL/L (ref 136–145)
TRIGL SERPL-MCNC: 57 MG/DL (ref 0–150)
TSH SERPL DL<=0.05 MIU/L-ACNC: 1.27 UIU/ML (ref 0.27–4.2)
VLDLC SERPL-MCNC: 11 MG/DL (ref 5–40)
WBC NRBC COR # BLD: 2.3 10*3/MM3 (ref 3.4–10.8)

## 2022-06-09 PROCEDURE — 80061 LIPID PANEL: CPT | Performed by: NURSE PRACTITIONER

## 2022-06-09 PROCEDURE — 87591 N.GONORRHOEAE DNA AMP PROB: CPT | Performed by: NURSE PRACTITIONER

## 2022-06-09 PROCEDURE — 87491 CHLMYD TRACH DNA AMP PROBE: CPT | Performed by: NURSE PRACTITIONER

## 2022-06-09 PROCEDURE — 36415 COLL VENOUS BLD VENIPUNCTURE: CPT | Performed by: NURSE PRACTITIONER

## 2022-06-09 PROCEDURE — 80050 GENERAL HEALTH PANEL: CPT | Performed by: NURSE PRACTITIONER

## 2022-06-09 PROCEDURE — G0123 SCREEN CERV/VAG THIN LAYER: HCPCS | Performed by: NURSE PRACTITIONER

## 2022-06-09 PROCEDURE — 82306 VITAMIN D 25 HYDROXY: CPT | Performed by: NURSE PRACTITIONER

## 2022-06-09 PROCEDURE — 99395 PREV VISIT EST AGE 18-39: CPT | Performed by: NURSE PRACTITIONER

## 2022-06-09 RX ORDER — PHENTERMINE HYDROCHLORIDE 37.5 MG/1
37.5 TABLET ORAL EVERY MORNING
COMMUNITY
Start: 2022-05-11 | End: 2022-06-09 | Stop reason: SDUPTHER

## 2022-06-09 NOTE — ASSESSMENT & PLAN NOTE
Patient's (Body mass index is 26.95 kg/m².) indicates that they are overweight with health conditions that include none . Weight is improving with treatment. BMI is is above average; BMI management plan is completed. We discussed portion control and increasing exercise.  Continue the phentermine for 2 additional months.

## 2022-06-09 NOTE — PROGRESS NOTES
"Chief Complaint  Gynecologic Exam    Subjective        Jhoana Garcia presents to [facility]  History of Present Illness  Is today for well woman exam.  Last Pap was in 2019.  She reports her menstrual cycles are regular.  Primarily uses pads and occasionally will use tampons.  She has a history of her fallopian tubes tied.  She is in a monogamous relationship.    She was recently started on phentermine last month.  Since been on phentermine she has had 6 pound weight loss.  She states her cravings for sweets and snacks have significantly decreased.  Tolerating phentermine well.    Objective   Vital Signs:  /78   Pulse 88   Temp 98.7 °F (37.1 °C)   Ht 162.6 cm (64\")   Wt 71.2 kg (157 lb)   SpO2 98%   BMI 26.95 kg/m²   Estimated body mass index is 26.95 kg/m² as calculated from the following:    Height as of this encounter: 162.6 cm (64\").    Weight as of this encounter: 71.2 kg (157 lb).    BMI is >= 25 and <30. (Overweight) The following options were offered after discussion;: exercise counseling/recommendations, nutrition counseling/recommendations and continue phenteramine      Physical Exam  Vitals reviewed. Exam conducted with a chaperone present.   Constitutional:       General: She is not in acute distress.     Appearance: Normal appearance. She is well-developed. She is not diaphoretic.   HENT:      Head: Normocephalic and atraumatic. Hair is normal.      Right Ear: Hearing, tympanic membrane, ear canal and external ear normal. No decreased hearing noted. No drainage.      Left Ear: Hearing, tympanic membrane, ear canal and external ear normal. No decreased hearing noted.      Nose: Nose normal. No nasal deformity.      Mouth/Throat:      Mouth: Mucous membranes are moist.   Eyes:      General: Lids are normal.         Right eye: No discharge.         Left eye: No discharge.      Extraocular Movements: Extraocular movements intact.      Conjunctiva/sclera: " Conjunctivae normal.      Pupils: Pupils are equal, round, and reactive to light.   Neck:      Thyroid: No thyromegaly.      Vascular: No JVD.   Cardiovascular:      Rate and Rhythm: Normal rate and regular rhythm.      Pulses: Normal pulses.      Heart sounds: Normal heart sounds. No murmur heard.    No friction rub. No gallop.   Pulmonary:      Effort: Pulmonary effort is normal. No respiratory distress.      Breath sounds: Normal breath sounds. No wheezing or rales.   Chest:      Chest wall: No tenderness.   Breasts: Breasts are symmetrical.      Right: Normal. No mass, nipple discharge, skin change or tenderness.      Left: Normal. No mass, nipple discharge, skin change or tenderness.       Abdominal:      General: Bowel sounds are normal. There is no distension.      Palpations: Abdomen is soft. There is no mass.      Tenderness: There is no abdominal tenderness. There is no guarding or rebound.      Hernia: No hernia is present.   Genitourinary:     General: Normal vulva.      Pubic Area: No rash.       Labia:         Right: No rash or lesion.         Left: No rash or lesion.       Urethra: No urethral swelling.      Vagina: Normal. No vaginal discharge or lesions.      Cervix: No cervical motion tenderness, discharge, friability, erythema, cervical bleeding or eversion.      Uterus: Normal. Not tender.       Adnexa: Right adnexa normal and left adnexa normal.        Right: No mass, tenderness or fullness.          Left: No mass, tenderness or fullness.        Rectum: Normal. No external hemorrhoid.   Musculoskeletal:         General: No tenderness or deformity. Normal range of motion.      Cervical back: Normal range of motion and neck supple.   Lymphadenopathy:      Cervical: No cervical adenopathy.   Skin:     General: Skin is warm and dry.      Findings: No erythema or rash.   Neurological:      Mental Status: She is alert and oriented to person, place, and time.      Cranial Nerves: No cranial nerve  deficit.      Motor: No abnormal muscle tone.      Coordination: Coordination normal.      Deep Tendon Reflexes: Reflexes are normal and symmetric. Reflexes normal.   Psychiatric:         Mood and Affect: Mood normal.         Behavior: Behavior normal.         Thought Content: Thought content normal.         Judgment: Judgment normal.        Result Review :                Assessment and Plan   Diagnoses and all orders for this visit:    1. Encounter for well woman exam with routine gynecological exam (Primary)  -     IGP,rfx Aptima HPV All Pth; Future  -     Chlamydia trachomatis, Neisseria gonorrhoeae, PCR - Urine, Urine, Clean Catch  -     CBC & Differential  -     Comprehensive Metabolic Panel  -     Lipid Panel  -     TSH Rfx On Abnormal To Free T4  -     IGP,rfx Aptima HPV All Pth    2. Vitamin D deficiency  -     Vitamin D 25 Hydroxy    3. Overweight with body mass index (BMI) of 26 to 26.9 in adult  Assessment & Plan:  Patient's (Body mass index is 26.95 kg/m².) indicates that they are overweight with health conditions that include none . Weight is improving with treatment. BMI is is above average; BMI management plan is completed. We discussed portion control and increasing exercise.  Continue the phentermine for 2 additional months.      5 core vaginal swab was obtained in office.  We will send vaginal swab to check for Candida's, bacterial vaginosis, trichomonas, herpes simplex virus, and HPV.  Discussed with patient to continue exercise and eat healthy.  Birth control measures.  Patient has had the fallopian tubes tied.  Discussed self breast exams at home.       Follow Up   Return in about 2 months (around 8/9/2022), or if symptoms worsen or fail to improve, for Next scheduled follow up.  Patient was given instructions and counseling regarding her condition or for health maintenance advice. Please see specific information pulled into the AVS if appropriate.

## 2022-06-11 DIAGNOSIS — B96.89 BV (BACTERIAL VAGINOSIS): Primary | ICD-10-CM

## 2022-06-11 DIAGNOSIS — N76.0 BV (BACTERIAL VAGINOSIS): Primary | ICD-10-CM

## 2022-06-11 RX ORDER — CLINDAMYCIN HYDROCHLORIDE 300 MG/1
300 CAPSULE ORAL 2 TIMES DAILY
Qty: 14 CAPSULE | Refills: 0 | Status: SHIPPED | OUTPATIENT
Start: 2022-06-11 | End: 2022-06-18

## 2022-06-13 LAB
CONV .: NORMAL
CYTOLOGIST CVX/VAG CYTO: NORMAL
CYTOLOGY CVX/VAG DOC CYTO: NORMAL
CYTOLOGY CVX/VAG DOC THIN PREP: NORMAL
DX ICD CODE: NORMAL
HIV 1 & 2 AB SER-IMP: NORMAL
OTHER STN SPEC: NORMAL
STAT OF ADQ CVX/VAG CYTO-IMP: NORMAL

## 2022-06-13 RX ORDER — ACETAMINOPHEN 160 MG
2000 TABLET,DISINTEGRATING ORAL DAILY
Qty: 90 CAPSULE | Refills: 3 | Status: SHIPPED | OUTPATIENT
Start: 2022-06-13 | End: 2022-09-06 | Stop reason: SDUPTHER

## 2022-06-13 RX ORDER — CLINDAMYCIN PHOSPHATE 11.9 MG/ML
SOLUTION TOPICAL
Qty: 30 ML | Refills: 1 | Status: SHIPPED | OUTPATIENT
Start: 2022-06-13 | End: 2022-09-06 | Stop reason: SDUPTHER

## 2022-09-06 ENCOUNTER — OFFICE VISIT (OUTPATIENT)
Dept: FAMILY MEDICINE CLINIC | Facility: CLINIC | Age: 27
End: 2022-09-06

## 2022-09-06 VITALS
TEMPERATURE: 99.3 F | OXYGEN SATURATION: 99 % | DIASTOLIC BLOOD PRESSURE: 52 MMHG | HEIGHT: 64 IN | BODY MASS INDEX: 26.09 KG/M2 | HEART RATE: 98 BPM | WEIGHT: 152.8 LBS | SYSTOLIC BLOOD PRESSURE: 110 MMHG

## 2022-09-06 DIAGNOSIS — E66.3 OVERWEIGHT WITH BODY MASS INDEX (BMI) OF 26 TO 26.9 IN ADULT: Primary | ICD-10-CM

## 2022-09-06 DIAGNOSIS — E55.9 VITAMIN D DEFICIENCY: ICD-10-CM

## 2022-09-06 DIAGNOSIS — L73.2 HIDRADENITIS SUPPURATIVA: ICD-10-CM

## 2022-09-06 PROCEDURE — 99213 OFFICE O/P EST LOW 20 MIN: CPT | Performed by: NURSE PRACTITIONER

## 2022-09-06 RX ORDER — CLINDAMYCIN PHOSPHATE 11.9 MG/ML
SOLUTION TOPICAL 2 TIMES DAILY PRN
Qty: 30 ML | Refills: 2 | Status: SHIPPED | OUTPATIENT
Start: 2022-09-06 | End: 2023-01-27

## 2022-09-06 RX ORDER — ACETAMINOPHEN 160 MG
2000 TABLET,DISINTEGRATING ORAL DAILY
Qty: 90 CAPSULE | Refills: 3 | Status: SHIPPED | OUTPATIENT
Start: 2022-09-06 | End: 2023-09-06

## 2022-09-06 NOTE — PROGRESS NOTES
"Chief Complaint  Vitamin D Deficiency    Subjective        Jhoana Garcia presents to [facility]  History of Present Illness  Presents today for a 3-month follow-up on overweight.  She is lost 11 pounds in the past 3 months.  She has approximately 2 weeks left of medications of phentermine.  She tolerated medication well.  She has intermittently been exercising over the past couple months.  Lately has not been exercising as much.  Hidradenitis suppurativa is well controlled with the clindamycin topical.    Objective   Vital Signs:  /52 (BP Location: Left arm, Patient Position: Sitting)   Pulse 98   Temp 99.3 °F (37.4 °C)   Ht 162.6 cm (64\")   Wt 69.3 kg (152 lb 12.8 oz)   SpO2 99%   BMI 26.23 kg/m²   Estimated body mass index is 26.23 kg/m² as calculated from the following:    Height as of this encounter: 162.6 cm (64\").    Weight as of this encounter: 69.3 kg (152 lb 12.8 oz).          Physical Exam  Vitals reviewed.   Constitutional:       Appearance: Normal appearance. She is well-developed.   HENT:      Head: Normocephalic and atraumatic.      Right Ear: External ear normal.      Left Ear: External ear normal.      Mouth/Throat:      Pharynx: No oropharyngeal exudate.   Eyes:      Conjunctiva/sclera: Conjunctivae normal.      Pupils: Pupils are equal, round, and reactive to light.   Cardiovascular:      Rate and Rhythm: Normal rate and regular rhythm.      Heart sounds: No murmur heard.    No friction rub. No gallop.   Pulmonary:      Effort: Pulmonary effort is normal.      Breath sounds: Normal breath sounds. No wheezing or rhonchi.   Abdominal:      General: Bowel sounds are normal. There is no distension.      Palpations: Abdomen is soft.      Tenderness: There is no abdominal tenderness.   Skin:     General: Skin is warm and dry.   Neurological:      Mental Status: She is alert and oriented to person, place, and time.      Cranial Nerves: No cranial nerve " deficit.   Psychiatric:         Mood and Affect: Mood and affect normal.         Behavior: Behavior normal.         Thought Content: Thought content normal.         Judgment: Judgment normal.        Result Review :{Labs  Result Review  Imaging  Med Tab  Media  Procedures  :23}                Assessment and Plan   Diagnoses and all orders for this visit:    1. Overweight with body mass index (BMI) of 26 to 26.9 in adult (Primary)    2. Hidradenitis suppurativa    3. Vitamin D deficiency    Other orders  -     clindamycin (CLEOCIN T) 1 % external solution; Apply  topically to the appropriate area as directed 2 (Two) Times a Day As Needed (hidradenitis suppurativa).  Dispense: 30 mL; Refill: 2  -     Cholecalciferol (Vitamin D3) 50 MCG (2000 UT) capsule; Take 1 capsule by mouth Daily.  Dispense: 90 capsule; Refill: 3    Refill the clindamycin and vitamin D medications.  Continue working on eating healthy and exercising.  Increase exercise and activity 150 minutes weekly.  Finish the phentermine tablets.  Afterwards discontinue medication.         Follow Up   Return in about 6 months (around 3/6/2023), or if symptoms worsen or fail to improve, for Next scheduled follow up.  Patient was given instructions and counseling regarding her condition or for health maintenance advice. Please see specific information pulled into the AVS if appropriate.

## 2023-01-27 PROCEDURE — 87660 TRICHOMONAS VAGIN DIR PROBE: CPT | Performed by: NURSE PRACTITIONER

## 2023-01-27 PROCEDURE — 87480 CANDIDA DNA DIR PROBE: CPT | Performed by: NURSE PRACTITIONER

## 2023-01-27 PROCEDURE — 87510 GARDNER VAG DNA DIR PROBE: CPT | Performed by: NURSE PRACTITIONER

## 2023-01-28 ENCOUNTER — TELEPHONE (OUTPATIENT)
Dept: URGENT CARE | Facility: CLINIC | Age: 28
End: 2023-01-28
Payer: COMMERCIAL

## 2023-01-28 NOTE — TELEPHONE ENCOUNTER
Spoke to patient who verified date of birth for patient safety notified of positive bacterial vaginosis and trichomonas results.  Advised patient to continue taking her prescribed course of Flagyl as this is treatment for both bacteria as.  Advised patient to avoid any alcohol consumption while taking Flagyl as this may cause stomach upset.  Advised patient to abstain from any sexual activity for the next 2 weeks in order to get the medication time to fully clear the infection.  Also advised patient that she may notify any sexual partners as they may need testing or treatment at her discretion.  Patient verbalized understanding no further questions

## 2023-02-15 ENCOUNTER — OFFICE VISIT (OUTPATIENT)
Dept: FAMILY MEDICINE CLINIC | Facility: CLINIC | Age: 28
End: 2023-02-15
Payer: COMMERCIAL

## 2023-02-15 VITALS
TEMPERATURE: 98.5 F | SYSTOLIC BLOOD PRESSURE: 102 MMHG | WEIGHT: 163 LBS | HEART RATE: 81 BPM | OXYGEN SATURATION: 99 % | HEIGHT: 64 IN | DIASTOLIC BLOOD PRESSURE: 58 MMHG | BODY MASS INDEX: 27.83 KG/M2

## 2023-02-15 DIAGNOSIS — Z23 NEED FOR INFLUENZA VACCINATION: ICD-10-CM

## 2023-02-15 DIAGNOSIS — Z11.59 ENCOUNTER FOR HEPATITIS C SCREENING TEST FOR LOW RISK PATIENT: ICD-10-CM

## 2023-02-15 DIAGNOSIS — Z00.00 ANNUAL PHYSICAL EXAM: Primary | ICD-10-CM

## 2023-02-15 DIAGNOSIS — E66.3 OVERWEIGHT WITH BODY MASS INDEX (BMI) OF 27 TO 27.9 IN ADULT: ICD-10-CM

## 2023-02-15 LAB
ALBUMIN SERPL-MCNC: 4.3 G/DL (ref 3.5–5.2)
ALBUMIN/GLOB SERPL: 1.5 G/DL
ALP SERPL-CCNC: 49 U/L (ref 39–117)
ALT SERPL W P-5'-P-CCNC: 18 U/L (ref 1–33)
ANION GAP SERPL CALCULATED.3IONS-SCNC: 8 MMOL/L (ref 5–15)
AST SERPL-CCNC: 18 U/L (ref 1–32)
BASOPHILS # BLD AUTO: 0.02 10*3/MM3 (ref 0–0.2)
BASOPHILS NFR BLD AUTO: 0.8 % (ref 0–1.5)
BILIRUB SERPL-MCNC: 0.6 MG/DL (ref 0–1.2)
BUN SERPL-MCNC: 13 MG/DL (ref 6–20)
BUN/CREAT SERPL: 16 (ref 7–25)
CALCIUM SPEC-SCNC: 9.2 MG/DL (ref 8.6–10.5)
CHLORIDE SERPL-SCNC: 103 MMOL/L (ref 98–107)
CHOLEST SERPL-MCNC: 173 MG/DL (ref 0–200)
CO2 SERPL-SCNC: 26 MMOL/L (ref 22–29)
CREAT SERPL-MCNC: 0.81 MG/DL (ref 0.57–1)
DEPRECATED RDW RBC AUTO: 39.9 FL (ref 37–54)
EGFRCR SERPLBLD CKD-EPI 2021: 102.2 ML/MIN/1.73
EOSINOPHIL # BLD AUTO: 0.09 10*3/MM3 (ref 0–0.4)
EOSINOPHIL NFR BLD AUTO: 3.6 % (ref 0.3–6.2)
ERYTHROCYTE [DISTWIDTH] IN BLOOD BY AUTOMATED COUNT: 13 % (ref 12.3–15.4)
GLOBULIN UR ELPH-MCNC: 2.8 GM/DL
GLUCOSE SERPL-MCNC: 69 MG/DL (ref 65–99)
HCT VFR BLD AUTO: 39 % (ref 34–46.6)
HCV AB SER DONR QL: NORMAL
HDLC SERPL-MCNC: 79 MG/DL (ref 40–60)
HGB BLD-MCNC: 12.7 G/DL (ref 12–15.9)
IMM GRANULOCYTES # BLD AUTO: 0 10*3/MM3 (ref 0–0.05)
IMM GRANULOCYTES NFR BLD AUTO: 0 % (ref 0–0.5)
LDLC SERPL CALC-MCNC: 84 MG/DL (ref 0–100)
LDLC/HDLC SERPL: 1.07 {RATIO}
LYMPHOCYTES # BLD AUTO: 1.12 10*3/MM3 (ref 0.7–3.1)
LYMPHOCYTES NFR BLD AUTO: 44.8 % (ref 19.6–45.3)
MCH RBC QN AUTO: 27.7 PG (ref 26.6–33)
MCHC RBC AUTO-ENTMCNC: 32.6 G/DL (ref 31.5–35.7)
MCV RBC AUTO: 85.2 FL (ref 79–97)
MONOCYTES # BLD AUTO: 0.22 10*3/MM3 (ref 0.1–0.9)
MONOCYTES NFR BLD AUTO: 8.8 % (ref 5–12)
NEUTROPHILS NFR BLD AUTO: 1.05 10*3/MM3 (ref 1.7–7)
NEUTROPHILS NFR BLD AUTO: 42 % (ref 42.7–76)
NRBC BLD AUTO-RTO: 0 /100 WBC (ref 0–0.2)
PLATELET # BLD AUTO: 165 10*3/MM3 (ref 140–450)
PMV BLD AUTO: 11.1 FL (ref 6–12)
POTASSIUM SERPL-SCNC: 4.4 MMOL/L (ref 3.5–5.2)
PROT SERPL-MCNC: 7.1 G/DL (ref 6–8.5)
RBC # BLD AUTO: 4.58 10*6/MM3 (ref 3.77–5.28)
SODIUM SERPL-SCNC: 137 MMOL/L (ref 136–145)
TRIGL SERPL-MCNC: 49 MG/DL (ref 0–150)
TSH SERPL DL<=0.05 MIU/L-ACNC: 1.19 UIU/ML (ref 0.27–4.2)
VLDLC SERPL-MCNC: 10 MG/DL (ref 5–40)
WBC NRBC COR # BLD: 2.5 10*3/MM3 (ref 3.4–10.8)

## 2023-02-15 PROCEDURE — 86803 HEPATITIS C AB TEST: CPT | Performed by: NURSE PRACTITIONER

## 2023-02-15 PROCEDURE — 90471 IMMUNIZATION ADMIN: CPT | Performed by: NURSE PRACTITIONER

## 2023-02-15 PROCEDURE — 36415 COLL VENOUS BLD VENIPUNCTURE: CPT | Performed by: NURSE PRACTITIONER

## 2023-02-15 PROCEDURE — 80050 GENERAL HEALTH PANEL: CPT | Performed by: NURSE PRACTITIONER

## 2023-02-15 PROCEDURE — 99395 PREV VISIT EST AGE 18-39: CPT | Performed by: NURSE PRACTITIONER

## 2023-02-15 PROCEDURE — 90686 IIV4 VACC NO PRSV 0.5 ML IM: CPT | Performed by: NURSE PRACTITIONER

## 2023-02-15 PROCEDURE — 80061 LIPID PANEL: CPT | Performed by: NURSE PRACTITIONER

## 2023-02-15 NOTE — PROGRESS NOTES
"Chief Complaint  Annual Exam ([employer] for work)    Subjective         Jhoana Garcia presents to [facility]  HPI   Presents today for an annual physical exam.  Overall patient states she is doing well.  She has had a dental exam in the last 4 months and an eye exam in the last 8 months.  She has gained a few pounds over the holidays.  She denies chest pain, syncope, palpitations.  Denies constipation, diarrhea, abdominal pain, dysuria.  Last Pap was 2022 was within normal limits.  Next Pap due in .  She is up-to-date on her tetanus vaccine.  She has had 2 previous COVID-19 vaccines.    Social History     Socioeconomic History   • Marital status: Single   Tobacco Use   • Smoking status: Former     Packs/day: 0.25     Years: 5.00     Pack years: 1.25     Types: Cigarettes     Quit date: 2021     Years since quittin.6   • Smokeless tobacco: Never   Vaping Use   • Vaping Use: Every day   • Substances: Nicotine, Flavoring   • Devices: Disposable, Pre-filled or refillable cartridge   Substance and Sexual Activity   • Alcohol use: Yes     Comment: rarely   • Drug use: Never        Objective     Vitals:    02/15/23 0714   BP: 102/58   BP Location: Left arm   Patient Position: Sitting   Cuff Size: Adult   Pulse: 81   Temp: 98.5 °F (36.9 °C)   TempSrc: Oral   SpO2: 99%   Weight: 73.9 kg (163 lb)   Height: 162.6 cm (64\")        Body mass index is 27.98 kg/m².    Wt Readings from Last 3 Encounters:   02/15/23 73.9 kg (163 lb)   23 76.2 kg (168 lb)   22 69.3 kg (152 lb 12.8 oz)       BP Readings from Last 3 Encounters:   02/15/23 102/58   23 108/71   22 110/52         Physical Exam  Vitals reviewed.   Constitutional:       Appearance: Normal appearance. She is well-developed.   HENT:      Head: Normocephalic and atraumatic.      Right Ear: Tympanic membrane and external ear normal.      Left Ear: Tympanic membrane and external ear normal.      " Mouth/Throat:      Mouth: Mucous membranes are moist.      Pharynx: No oropharyngeal exudate.   Eyes:      Conjunctiva/sclera: Conjunctivae normal.      Pupils: Pupils are equal, round, and reactive to light.   Cardiovascular:      Rate and Rhythm: Normal rate and regular rhythm.      Heart sounds: No murmur heard.    No friction rub. No gallop.   Pulmonary:      Effort: Pulmonary effort is normal.      Breath sounds: Normal breath sounds. No wheezing or rhonchi.   Abdominal:      General: Bowel sounds are normal. There is no distension.      Palpations: Abdomen is soft.      Tenderness: There is no abdominal tenderness.   Musculoskeletal:      Cervical back: Neck supple.   Lymphadenopathy:      Cervical: No cervical adenopathy.   Skin:     General: Skin is warm and dry.   Neurological:      Mental Status: She is alert and oriented to person, place, and time.   Psychiatric:         Mood and Affect: Mood and affect normal.         Behavior: Behavior normal.         Thought Content: Thought content normal.         Judgment: Judgment normal.          Result Review :   The following data was reviewed by: DIMITRI Guillen on 02/15/2023:  Common labs    Common Labs 6/9/22 6/9/22 6/9/22    1050 1050 1050   Glucose  80    BUN  13    Creatinine  0.72    Sodium  137    Potassium  4.2    Chloride  103    Calcium  8.9    Albumin  4.50    Total Bilirubin  0.5    Alkaline Phosphatase  47    AST (SGOT)  19    ALT (SGPT)  14    WBC 2.30 (A)     Hemoglobin 12.9     Hematocrit 40.1     Platelets 153     Total Cholesterol   164   Triglycerides   57   HDL Cholesterol   71 (A)   LDL Cholesterol    82   (A) Abnormal value              Procedures    Assessment and Plan   Diagnoses and all orders for this visit:    1. Annual physical exam (Primary)  -     CBC & Differential  -     Comprehensive Metabolic Panel  -     Lipid Panel  -     TSH    2. Need for influenza vaccination  -     FluLaval/Fluzone >6 mos ([id number])    3. Encounter  for hepatitis C screening test for low risk patient  -     Hepatitis C Antibody    4. Overweight with body mass index (BMI) of 27 to 27.9 in adult    She has gained a couple pounds.  Encouraged her to continue exercising and diet.  She is up-to-date on her Tdap and COVID vaccines.  She will get a flu vaccine today.  Screen for hepatitis C.  Check following labs including CBC CMP TSH and lipid.  Up-to-date on her Pap.      BMI is >= 25 and <30. (Overweight) The following options were offered after discussion;: exercise counseling/recommendations and nutrition counseling/recommendations        Follow Up   Return in about 1 year (around 2/15/2024) for Annual physical.  Patient was given instructions and counseling regarding her condition or for health maintenance advice. Please see specific information pulled into the AVS if appropriate.     Please note that portions of this note were completed with a voice recognition program.

## 2023-02-20 DIAGNOSIS — D72.819 LEUKOPENIA, UNSPECIFIED TYPE: Primary | ICD-10-CM

## 2023-02-21 ENCOUNTER — TELEPHONE (OUTPATIENT)
Dept: FAMILY MEDICINE CLINIC | Facility: CLINIC | Age: 28
End: 2023-02-21
Payer: COMMERCIAL

## 2023-02-21 NOTE — TELEPHONE ENCOUNTER
Spoke with patient and let her know her paperwork for work is ready to be picked up. Patient verbalized understanding.

## 2023-02-28 ENCOUNTER — TELEPHONE (OUTPATIENT)
Dept: OBSTETRICS AND GYNECOLOGY | Facility: CLINIC | Age: 28
End: 2023-02-28
Payer: COMMERCIAL

## 2023-03-07 ENCOUNTER — PATIENT ROUNDING (BHMG ONLY) (OUTPATIENT)
Dept: OBSTETRICS AND GYNECOLOGY | Facility: CLINIC | Age: 28
End: 2023-03-07

## 2023-03-07 ENCOUNTER — OFFICE VISIT (OUTPATIENT)
Dept: OBSTETRICS AND GYNECOLOGY | Facility: CLINIC | Age: 28
End: 2023-03-07
Payer: COMMERCIAL

## 2023-03-07 VITALS
SYSTOLIC BLOOD PRESSURE: 107 MMHG | BODY MASS INDEX: 28.34 KG/M2 | DIASTOLIC BLOOD PRESSURE: 75 MMHG | WEIGHT: 166 LBS | HEIGHT: 64 IN | HEART RATE: 88 BPM

## 2023-03-07 DIAGNOSIS — R10.2 PELVIC PAIN IN FEMALE: Primary | ICD-10-CM

## 2023-03-07 DIAGNOSIS — N94.10 DYSPAREUNIA IN FEMALE: ICD-10-CM

## 2023-03-07 DIAGNOSIS — R35.0 URINARY FREQUENCY: ICD-10-CM

## 2023-03-07 DIAGNOSIS — N89.8 VAGINAL DISCHARGE: ICD-10-CM

## 2023-03-07 LAB
CANDIDA SPECIES: NEGATIVE
GARDNERELLA VAGINALIS: NEGATIVE
T VAGINALIS DNA VAG QL PROBE+SIG AMP: NEGATIVE

## 2023-03-07 PROCEDURE — 99213 OFFICE O/P EST LOW 20 MIN: CPT | Performed by: OBSTETRICS & GYNECOLOGY

## 2023-03-07 PROCEDURE — 87480 CANDIDA DNA DIR PROBE: CPT | Performed by: OBSTETRICS & GYNECOLOGY

## 2023-03-07 PROCEDURE — 87660 TRICHOMONAS VAGIN DIR PROBE: CPT | Performed by: OBSTETRICS & GYNECOLOGY

## 2023-03-07 PROCEDURE — 87510 GARDNER VAG DNA DIR PROBE: CPT | Performed by: OBSTETRICS & GYNECOLOGY

## 2023-03-07 NOTE — PROGRESS NOTES
"GYN Problem/Follow Up Visit    Chief Complaint   Patient presents with   • PELVIC PAIN AND PAINFUL INTERCOURSE           HPI  Jhoana Garcia is a 27 y.o. female, , who presents for pelvic pain x 1 year. States the pain is intermittent, occurring every few days at least, worse in bilat pelvis. Also states intercourse is sometimes painful. Does not matter what position. Also c/o urinating hourly at times and during the night for a long time. Denies dysuria. Also recently treated for bv and trich. States partner tested negative and was not treated. No new partners. q mon menses x 7 days, 1-2 heavy days.        Additional OB/GYN History   Patient's last menstrual period was 2023 (approximate).  Current contraception: contraceptive methods: Tubal ligation  Desires to: continue contraception  Allergies : Penicillins, Sulfa antibiotics, Septra [sulfamethoxazole-trimethoprim], and Adhesive tape     The additional following portions of the patient's history were reviewed and updated as appropriate: allergies, current medications, past family history, past medical history, past social history, past surgical history and problem list.    Review of Systems    I have reviewed and agree with the HPI, ROS, and historical information as entered above. Caitlin Rahman, APRN    Objective   /75   Pulse 88   Ht 162.6 cm (64\")   Wt 75.3 kg (166 lb)   LMP 2023 (Approximate)   BMI 28.49 kg/m²     Physical Exam  Vitals reviewed.   Genitourinary:     General: Normal vulva.      Vagina: No signs of injury and foreign body. Vaginal discharge (small amt white d/c) present. No erythema, tenderness, bleeding, lesions or prolapsed vaginal walls.      Cervix: Discharge present. No cervical motion tenderness, friability, lesion, erythema or cervical bleeding.      Uterus: Normal. Not tender.       Adnexa: Right adnexa normal and left adnexa normal.        Right: No tenderness.          Left: No tenderness.     Skin:     " General: Skin is warm and dry.   Neurological:      Mental Status: She is alert and oriented to person, place, and time.            Assessment and Plan    Diagnoses and all orders for this visit:    1. Pelvic pain in female (Primary)  -      Non-ob Transvaginal; Future  -     Gardnerella vaginalis, Trichomonas vaginalis, Candida albicans, DNA - Swab, Vagina  -     Ambulatory Referral to Urology    2. Dyspareunia in female  -      Non-ob Transvaginal; Future  -     Gardnerella vaginalis, Trichomonas vaginalis, Candida albicans, DNA - Swab, Vagina  -     Ambulatory Referral to Urology    3. Urinary frequency  -     Gardnerella vaginalis, Trichomonas vaginalis, Candida albicans, DNA - Swab, Vagina  -     Ambulatory Referral to Urology    4. Vaginal discharge  -     Gardnerella vaginalis, Trichomonas vaginalis, Candida albicans, DNA - Swab, Vagina    will check for infections. Will check pelvic u/s. Discussed possible IC and referral to urologist and pt desires. F/u after u/s.    Counseling:  She understands the importance of having the above orders performed in a timely fashion.  She is encouraged to review her results online and/or contact or office if she has questions.     Follow Up:  Return for u/s f/u.      DIMITRI Hale  03/07/2023

## 2023-03-14 ENCOUNTER — TELEPHONE (OUTPATIENT)
Dept: FAMILY MEDICINE CLINIC | Facility: CLINIC | Age: 28
End: 2023-03-14
Payer: COMMERCIAL
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
## 2023-03-14 NOTE — TELEPHONE ENCOUNTER
This message was from a previous encounter: Left voicemail for patient stating she has a 6 month follow up scheduled for tomorrow, 03/15/2023, but she just had a physical done in February 2023 and Bernardo Souza stated she could follow up in one year. I was calling to see if she wanted to cancel that appt and that she could call back to cancel.     This message was stated by Chris LEE. Patient stated that she wanted to keep her appt. So we will not be rescheduling.

## 2023-03-14 NOTE — TELEPHONE ENCOUNTER
Left voicemail for patient stating she has a 6 month follow up scheduled for tomorrow, 03/15/2023, but she just had a physical done in February 2023 and Bernardo Souza stated she could follow up in one year. I was calling to see if she wanted to cancel that appt and that she could call back to cancel.

## 2023-03-15 ENCOUNTER — OFFICE VISIT (OUTPATIENT)
Dept: FAMILY MEDICINE CLINIC | Facility: CLINIC | Age: 28
End: 2023-03-15
Payer: COMMERCIAL

## 2023-03-15 VITALS
BODY MASS INDEX: 28.07 KG/M2 | HEART RATE: 72 BPM | OXYGEN SATURATION: 99 % | HEIGHT: 64 IN | SYSTOLIC BLOOD PRESSURE: 122 MMHG | WEIGHT: 164.4 LBS | TEMPERATURE: 98.7 F | DIASTOLIC BLOOD PRESSURE: 58 MMHG

## 2023-03-15 DIAGNOSIS — E66.3 OVERWEIGHT WITH BODY MASS INDEX (BMI) OF 28 TO 28.9 IN ADULT: Primary | ICD-10-CM

## 2023-03-15 DIAGNOSIS — L73.2 HIDRADENITIS SUPPURATIVA: ICD-10-CM

## 2023-03-15 PROCEDURE — 99213 OFFICE O/P EST LOW 20 MIN: CPT | Performed by: NURSE PRACTITIONER

## 2023-03-15 RX ORDER — CLINDAMYCIN PHOSPHATE 11.9 MG/ML
SOLUTION TOPICAL 2 TIMES DAILY
Qty: 60 ML | Refills: 1 | Status: SHIPPED | OUTPATIENT
Start: 2023-03-15

## 2023-03-15 NOTE — PROGRESS NOTES
"Chief Complaint  weight management (Was on phentermine, doing a weight check)    Subjective         Jhoana Garcia presents to [facility]  HPI   Presents today to follow-up on being overweight.  She was previously prescribed phentermine last year between May through July.  She had lost 15 pounds.  Since stopping medication she has regained her weight back and then some.  She reports frequent snacking.  He has a history of hidradenitis suppurativa, gallstones, and kidney stone.  Uses clindamycin external solution daily and as needed for the hidradenitis suppurativa.    Social History     Socioeconomic History   • Marital status: Single   Tobacco Use   • Smoking status: Former     Packs/day: 0.25     Years: 5.00     Pack years: 1.25     Types: Cigarettes     Quit date: 2021     Years since quittin.7   • Smokeless tobacco: Never   Vaping Use   • Vaping Use: Every day   • Substances: Nicotine, Flavoring   • Devices: Disposable, Pre-filled or refillable cartridge   Substance and Sexual Activity   • Alcohol use: Yes     Comment: rarely   • Drug use: Never   • Sexual activity: Yes     Partners: Male     Birth control/protection: Condom, Tubal ligation        Objective     Vitals:    03/15/23 1053   BP: 122/58   BP Location: Left arm   Patient Position: Sitting   Cuff Size: Adult   Pulse: 72   Temp: 98.7 °F (37.1 °C)   TempSrc: Oral   SpO2: 99%   Weight: 74.6 kg (164 lb 6.4 oz)   Height: 162.6 cm (64\")        Body mass index is 28.22 kg/m².    Wt Readings from Last 3 Encounters:   03/15/23 74.6 kg (164 lb 6.4 oz)   23 75.3 kg (166 lb)   02/15/23 73.9 kg (163 lb)       BP Readings from Last 3 Encounters:   03/15/23 122/58   23 107/75   02/15/23 102/58         Physical Exam  Vitals reviewed.   Constitutional:       Appearance: Normal appearance. She is overweight.   HENT:      Head: Normocephalic and atraumatic.      Right Ear: External ear normal.      Left Ear: External " ear normal.      Mouth/Throat:      Pharynx: No oropharyngeal exudate.   Eyes:      Conjunctiva/sclera: Conjunctivae normal.      Pupils: Pupils are equal, round, and reactive to light.   Cardiovascular:      Rate and Rhythm: Normal rate and regular rhythm.      Heart sounds: No murmur heard.    No friction rub. No gallop.   Pulmonary:      Effort: Pulmonary effort is normal.      Breath sounds: Normal breath sounds. No wheezing or rhonchi.   Abdominal:      General: Bowel sounds are normal. There is no distension.      Palpations: Abdomen is soft.      Tenderness: There is no abdominal tenderness.   Skin:     General: Skin is warm and dry.   Neurological:      Mental Status: She is alert and oriented to person, place, and time.   Psychiatric:         Mood and Affect: Mood and affect normal.         Behavior: Behavior normal.         Thought Content: Thought content normal.         Judgment: Judgment normal.          Result Review :   The following data was reviewed by: DIMITRI Guillen on 03/15/2023:      Procedures    Assessment and Plan   Diagnoses and all orders for this visit:    1. Overweight with body mass index (BMI) of 28 to 28.9 in adult (Primary)  -     Semaglutide-Weight Management 0.25 MG/0.5ML solution auto-injector; Inject 0.25 mg under the skin into the appropriate area as directed 1 (One) Time Per Week.  Dispense: 2 mL; Refill: 0    2. Hidradenitis suppurativa    Other orders  -     clindamycin (Cleocin-T) 1 % external solution; Apply  topically to the appropriate area as directed 2 (Two) Times a Day.  Dispense: 60 mL; Refill: 1    We will see if insurance will cover awake OV and to start Wegovy 0.25 mg daily.  Showed pen demonstration in office.  If insurance will not cover Wegovy we will restart her on phentermine 37.5 mg daily.      BMI is >= 25 and <30. (Overweight) The following options were offered after discussion;: exercise counseling/recommendations, nutrition counseling/recommendations  and Wegovy and phentermine        Follow Up   No follow-ups on file.  Patient was given instructions and counseling regarding her condition or for health maintenance advice. Please see specific information pulled into the AVS if appropriate.     Please note that portions of this note were completed with a voice recognition program.

## 2023-03-16 ENCOUNTER — TELEPHONE (OUTPATIENT)
Dept: FAMILY MEDICINE CLINIC | Facility: CLINIC | Age: 28
End: 2023-03-16
Payer: COMMERCIAL

## 2023-03-16 ENCOUNTER — CONSULT (OUTPATIENT)
Dept: ONCOLOGY | Facility: HOSPITAL | Age: 28
End: 2023-03-16
Payer: COMMERCIAL

## 2023-03-16 ENCOUNTER — LAB (OUTPATIENT)
Dept: ONCOLOGY | Facility: HOSPITAL | Age: 28
End: 2023-03-16
Payer: COMMERCIAL

## 2023-03-16 VITALS
RESPIRATION RATE: 18 BRPM | HEART RATE: 80 BPM | DIASTOLIC BLOOD PRESSURE: 70 MMHG | BODY MASS INDEX: 28.46 KG/M2 | SYSTOLIC BLOOD PRESSURE: 110 MMHG | OXYGEN SATURATION: 100 % | TEMPERATURE: 97.6 F | WEIGHT: 165.79 LBS

## 2023-03-16 DIAGNOSIS — E66.3 OVERWEIGHT WITH BODY MASS INDEX (BMI) OF 28 TO 28.9 IN ADULT: Primary | ICD-10-CM

## 2023-03-16 DIAGNOSIS — D72.819 LEUKOPENIA, UNSPECIFIED TYPE: Primary | ICD-10-CM

## 2023-03-16 DIAGNOSIS — D72.819 LEUKOPENIA, UNSPECIFIED TYPE: ICD-10-CM

## 2023-03-16 LAB
BASOPHILS # BLD AUTO: 0 10*3/MM3 (ref 0–0.2)
BASOPHILS # BLD MANUAL: 0.06 10*3/MM3 (ref 0–0.2)
BASOPHILS NFR BLD AUTO: 0 % (ref 0–1.5)
BASOPHILS NFR BLD MANUAL: 2 % (ref 0–1.5)
BURR CELLS BLD QL SMEAR: ABNORMAL
CYTO UR: NORMAL
DEPRECATED RDW RBC AUTO: 42.5 FL (ref 37–54)
EOSINOPHIL # BLD AUTO: 0.08 10*3/MM3 (ref 0–0.4)
EOSINOPHIL # BLD MANUAL: 0.15 10*3/MM3 (ref 0–0.4)
EOSINOPHIL NFR BLD AUTO: 2.7 % (ref 0.3–6.2)
EOSINOPHIL NFR BLD MANUAL: 5 % (ref 0.3–6.2)
ERYTHROCYTE [DISTWIDTH] IN BLOOD BY AUTOMATED COUNT: 13.3 % (ref 12.3–15.4)
FOLATE SERPL-MCNC: 15 NG/ML (ref 4.78–24.2)
HAV IGM SERPL QL IA: NORMAL
HBV CORE IGM SERPL QL IA: NORMAL
HBV SURFACE AG SERPL QL IA: NORMAL
HCT VFR BLD AUTO: 39.6 % (ref 34–46.6)
HCV AB SER DONR QL: NORMAL
HGB BLD-MCNC: 12.8 G/DL (ref 12–15.9)
HIV1+2 AB SER QL: NORMAL
IMM GRANULOCYTES # BLD AUTO: 0 10*3/MM3 (ref 0–0.05)
IMM GRANULOCYTES NFR BLD AUTO: 0 % (ref 0–0.5)
LAB AP CASE REPORT: NORMAL
LAB AP CLINICAL INFORMATION: NORMAL
LDH SERPL-CCNC: 135 U/L (ref 135–214)
LYMPHOCYTES # BLD AUTO: 1.27 10*3/MM3 (ref 0.7–3.1)
LYMPHOCYTES # BLD MANUAL: 1 10*3/MM3 (ref 0.7–3.1)
LYMPHOCYTES NFR BLD AUTO: 43.2 % (ref 19.6–45.3)
LYMPHOCYTES NFR BLD MANUAL: 5 % (ref 5–12)
MCH RBC QN AUTO: 28.1 PG (ref 26.6–33)
MCHC RBC AUTO-ENTMCNC: 32.3 G/DL (ref 31.5–35.7)
MCV RBC AUTO: 86.8 FL (ref 79–97)
MONOCYTES # BLD AUTO: 0.16 10*3/MM3 (ref 0.1–0.9)
MONOCYTES # BLD: 0.15 10*3/MM3 (ref 0.1–0.9)
MONOCYTES NFR BLD AUTO: 5.4 % (ref 5–12)
NEUTROPHILS # BLD AUTO: 1.59 10*3/MM3 (ref 1.7–7)
NEUTROPHILS NFR BLD AUTO: 1.43 10*3/MM3 (ref 1.7–7)
NEUTROPHILS NFR BLD AUTO: 48.7 % (ref 42.7–76)
NEUTROPHILS NFR BLD MANUAL: 54 % (ref 42.7–76)
OVALOCYTES BLD QL SMEAR: ABNORMAL
PATH REPORT.FINAL DX SPEC: NORMAL
PATH REPORT.GROSS SPEC: NORMAL
PATHOLOGY REVIEW: YES
PLAT MORPH BLD: NORMAL
PLATELET # BLD AUTO: 160 10*3/MM3 (ref 140–450)
PMV BLD AUTO: 10.6 FL (ref 6–12)
POIKILOCYTOSIS BLD QL SMEAR: ABNORMAL
RBC # BLD AUTO: 4.56 10*6/MM3 (ref 3.77–5.28)
VARIANT LYMPHS NFR BLD MANUAL: 1 % (ref 0–5)
VARIANT LYMPHS NFR BLD MANUAL: 33 % (ref 19.6–45.3)
VIT B12 BLD-MCNC: 380 PG/ML (ref 211–946)
WBC MORPH BLD: NORMAL
WBC NRBC COR # BLD: 2.94 10*3/MM3 (ref 3.4–10.8)

## 2023-03-16 PROCEDURE — 80074 ACUTE HEPATITIS PANEL: CPT

## 2023-03-16 PROCEDURE — 83615 LACTATE (LD) (LDH) ENZYME: CPT

## 2023-03-16 PROCEDURE — G0463 HOSPITAL OUTPT CLINIC VISIT: HCPCS | Performed by: INTERNAL MEDICINE

## 2023-03-16 PROCEDURE — 85025 COMPLETE CBC W/AUTO DIFF WBC: CPT

## 2023-03-16 PROCEDURE — 82607 VITAMIN B-12: CPT

## 2023-03-16 PROCEDURE — 82746 ASSAY OF FOLIC ACID SERUM: CPT

## 2023-03-16 PROCEDURE — G0432 EIA HIV-1/HIV-2 SCREEN: HCPCS

## 2023-03-16 PROCEDURE — 82525 ASSAY OF COPPER: CPT

## 2023-03-16 PROCEDURE — 99204 OFFICE O/P NEW MOD 45 MIN: CPT | Performed by: INTERNAL MEDICINE

## 2023-03-16 PROCEDURE — G0463 HOSPITAL OUTPT CLINIC VISIT: HCPCS

## 2023-03-16 PROCEDURE — 36415 COLL VENOUS BLD VENIPUNCTURE: CPT

## 2023-03-16 RX ORDER — PHENTERMINE HYDROCHLORIDE 37.5 MG/1
37.5 CAPSULE ORAL EVERY MORNING
Qty: 30 CAPSULE | Refills: 2 | Status: SHIPPED | OUTPATIENT
Start: 2023-03-16

## 2023-03-16 NOTE — TELEPHONE ENCOUNTER
Called patient and let her know that provider sent in phentermine since Wegovy isn't covered by insurance. Patient stated that she understood.

## 2023-03-16 NOTE — PROGRESS NOTES
Chief Complaint  Leukopenia    Jack Souza, DIMITRI Souza, Jack, DIMITRI    Subjective          Jhoana Garcia presents to [facility] for neutropenia    Ms. Garcia presents for neutropenia. Seen previously by Dr. Hopper. She feels well outside of fatigue. She does have occasional light-headedness and mild headaches. No migraines. Denies frequent infections. No fevers, chills, weight loss, rash, swollen glands. Denies chronic diarrhea or chronic cough. No mouth sores reported. CBC without anemia or thrombocytopenia. ANC 1.05.     Hematology History    Previously seen here by Dr. Hopper    Low neutrophils since 2019 and likely prior    11/21/19: WBC 4.41, ANC 2.5, hgb 13.8, plt 220    1/18/21: WBC 2.76, ANC 1.38, hgb 13.3, plt 170    6/9/22: WBC 2.3, ANC 0.79    2/15/23: WBC 2.5, ANC 1.05, Hgb 12.7, Plt 165    Review of Systems   Constitutional: Positive for fatigue.   Endocrine: Positive for cold intolerance.   Neurological: Positive for dizziness and headache.   All other systems reviewed and are negative.    Current Outpatient Medications on File Prior to Visit   Medication Sig Dispense Refill   • Cholecalciferol (Vitamin D3) 50 MCG (2000 UT) capsule Take 1 capsule by mouth Daily. 90 capsule 3   • clindamycin (Cleocin-T) 1 % external solution Apply  topically to the appropriate area as directed 2 (Two) Times a Day. 60 mL 1   • Semaglutide-Weight Management 0.25 MG/0.5ML solution auto-injector Inject 0.25 mg under the skin into the appropriate area as directed 1 (One) Time Per Week. 2 mL 0     No current facility-administered medications on file prior to visit.       Allergies   Allergen Reactions   • Penicillins Hives   • Sulfa Antibiotics Hives   • Septra [Sulfamethoxazole-Trimethoprim] Hives   • Adhesive Tape Rash     Had an allergic reaction to adhesive tape placed on upper arm. Developed a large boil.      Past Medical History:   Diagnosis Date   • Urogenital  trichomoniasis      Past Surgical History:   Procedure Laterality Date   • CHOLECYSTECTOMY     • TUBAL ABDOMINAL LIGATION     • WISDOM TOOTH EXTRACTION       Social History     Socioeconomic History   • Marital status: Single   Tobacco Use   • Smoking status: Former     Packs/day: 0.25     Years: 5.00     Pack years: 1.25     Types: Cigarettes     Quit date: 2021     Years since quittin.7   • Smokeless tobacco: Never   Vaping Use   • Vaping Use: Every day   • Substances: Nicotine, Flavoring   • Devices: Disposable, Pre-filled or refillable cartridge   Substance and Sexual Activity   • Alcohol use: Yes     Comment: rarely   • Drug use: Never   • Sexual activity: Yes     Partners: Male     Birth control/protection: Condom, Tubal ligation     Family History   Problem Relation Age of Onset   • Breast cancer Neg Hx    • Ovarian cancer Neg Hx    • Uterine cancer Neg Hx    • Colon cancer Neg Hx    • Melanoma Neg Hx    • Pulmonary embolism Neg Hx        Objective   Physical Exam  Well appearing patient in no acute distress on RA  Anicteric sclerae, no rash on exposed skin  Respirations non-labored  Awake, alert, and oriented x 4. Speech intact. No gross neurologic deficit  Appropriate mood and affect    Vitals:    23 0934   BP: 110/70   Pulse: 80   Resp: 18   Temp: 97.6 °F (36.4 °C)   TempSrc: Temporal   SpO2: 100%   Weight: 75.2 kg (165 lb 12.6 oz)   PainSc: 0-No pain               PHQ-9 Total Score:                      Result Review :   The following data was reviewed by: Arron Nova MD on 23:  Lab Results   Component Value Date    HGB 12.7 02/15/2023    HCT 39.0 02/15/2023    MCV 85.2 02/15/2023     02/15/2023    WBC 2.50 (L) 02/15/2023    NEUTROABS 1.05 (L) 02/15/2023    LYMPHSABS 1.12 02/15/2023    MONOSABS 0.22 02/15/2023    EOSABS 0.09 02/15/2023    BASOSABS 0.02 02/15/2023     Lab Results   Component Value Date    GLUCOSE 69 02/15/2023    BUN 13 02/15/2023    CREATININE 0.81  02/15/2023     02/15/2023    K 4.4 02/15/2023     02/15/2023    CO2 26.0 02/15/2023    CALCIUM 9.2 02/15/2023    PROTEINTOT 7.1 02/15/2023    ALBUMIN 4.3 02/15/2023    BILITOT 0.6 02/15/2023    ALKPHOS 49 02/15/2023    AST 18 02/15/2023    ALT 18 02/15/2023     Lab Results   Component Value Date    FREET4 1.0 01/13/2021    TSH 1.190 02/15/2023     Labs personally reviewed. Mild neutropenia. No anemia. No thrombocytopenia.        PCP note dated 3/15/23 personally reviewed      No radiology results for the last 30 days.        Assessment and Plan    Diagnoses and all orders for this visit:    1. Leukopenia, unspecified type (Primary)  -     Hepatitis Panel, Acute; Future  -     HIV-1 & HIV-2 Antibodies; Future  -     Vitamin B12; Future  -     Copper, Serum; Future  -     Folate; Future  -     Lactate Dehydrogenase; Future  -     Peripheral Blood Smear; Future  -     CBC & Differential; Future    CBC normal outside of neutropenia. ANC range of 1.0 to 1.3. No frequent infections or mouth sores. No other concernign symptoms. Present for several years. 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 Will rule out viral etiologies and vitamin deficiencies with labs as above. Reassurance provided. As ANC stable/no infections and no abnormalities in Hgb or plts, will defer on bone marrow biopsy.  Repeat CBC in 1 year.         Patient Follow Up: 1 year with CBC.   Patient was given instructions and counseling regarding her condition or for health maintenance advice. Please see specific information pulled into the AVS if appropriate.

## 2023-03-18 LAB — COPPER SERPL-MCNC: 126 UG/DL (ref 80–158)

## 2023-04-18 ENCOUNTER — HOSPITAL ENCOUNTER (OUTPATIENT)
Dept: ULTRASOUND IMAGING | Facility: HOSPITAL | Age: 28
Discharge: HOME | End: 2023-04-18
Admitting: OBSTETRICS & GYNECOLOGY
Payer: COMMERCIAL

## 2023-04-18 DIAGNOSIS — N94.10 DYSPAREUNIA IN FEMALE: ICD-10-CM

## 2023-04-18 DIAGNOSIS — R10.2 PELVIC PAIN IN FEMALE: ICD-10-CM

## 2023-04-18 PROCEDURE — 76830 TRANSVAGINAL US NON-OB: CPT

## 2023-11-09 ENCOUNTER — OFFICE VISIT (OUTPATIENT)
Dept: FAMILY MEDICINE CLINIC | Facility: CLINIC | Age: 28
End: 2023-11-09
Payer: COMMERCIAL

## 2023-11-09 VITALS
HEART RATE: 77 BPM | WEIGHT: 165 LBS | DIASTOLIC BLOOD PRESSURE: 64 MMHG | OXYGEN SATURATION: 99 % | TEMPERATURE: 97.7 F | HEIGHT: 64 IN | BODY MASS INDEX: 28.17 KG/M2 | SYSTOLIC BLOOD PRESSURE: 102 MMHG

## 2023-11-09 DIAGNOSIS — L73.2 HYDRADENITIS: Primary | ICD-10-CM

## 2023-11-09 DIAGNOSIS — E66.3 OVERWEIGHT (BMI 25.0-29.9): ICD-10-CM

## 2023-11-09 DIAGNOSIS — E66.3 OVERWEIGHT WITH BODY MASS INDEX (BMI) OF 28 TO 28.9 IN ADULT: ICD-10-CM

## 2023-11-09 RX ORDER — CLINDAMYCIN PHOSPHATE 11.9 MG/ML
SOLUTION TOPICAL 2 TIMES DAILY
Qty: 60 ML | Refills: 1 | Status: SHIPPED | OUTPATIENT
Start: 2023-11-09

## 2023-11-09 RX ORDER — PHENTERMINE HYDROCHLORIDE 37.5 MG/1
37.5 CAPSULE ORAL EVERY MORNING
Qty: 30 CAPSULE | Refills: 2 | Status: SHIPPED | OUTPATIENT
Start: 2023-11-09

## 2023-11-09 NOTE — PROGRESS NOTES
"Chief Complaint  Establish Care (New patient, PCP transfer of care), Weight Check (Pt would like to discuss options for weight loss), and Med Refill (clindamycin (Cleocin-T) 1 % external solution)    Subjective      History of Present Illness  Jhoana Garcia is a 28 y.o. female who presents to [facility] with a past medical history of hidradenitis, presents today to establish care with me.  She has been taking phentermine off and on for 3 months at a time with the previous provider and states she needs a refill today.  She also would like to refill on Cleocin for her hidradenitis.  States she does not have any side effects with phentermine and has been taking it 3 months on 3 months off.  Vel checked.  Patient has already signed a controlled substance agreement.  We will plan to get a UDS at next visit.  Past Medical History:   Diagnosis Date    Urogenital trichomoniasis          Objective   Vital Signs:   Vitals:    11/09/23 0909   BP: 102/64   BP Location: Right arm   Patient Position: Sitting   Cuff Size: Adult   Pulse: 77   Temp: 97.7 °F (36.5 °C)   TempSrc: Temporal   SpO2: 99%   Weight: 74.8 kg (165 lb)   Height: 162.6 cm (64\")   PainSc: 0-No pain       Wt Readings from Last 3 Encounters:   11/09/23 74.8 kg (165 lb)   03/16/23 75.2 kg (165 lb 12.6 oz)   03/15/23 74.6 kg (164 lb 6.4 oz)     BP Readings from Last 3 Encounters:   11/09/23 102/64   03/16/23 110/70   03/15/23 122/58       Health Maintenance   Topic Date Due    INFLUENZA VACCINE  03/31/2024 (Originally 8/1/2023)    COVID-19 Vaccine (3 - 2023-24 season) 12/12/2112 (Originally 9/1/2023)    ANNUAL PHYSICAL  02/15/2024    BMI FOLLOWUP  11/09/2024    PAP SMEAR  06/09/2025    TDAP/TD VACCINES (2 - Td or Tdap) 09/25/2029    HEPATITIS C SCREENING  Completed    Pneumococcal Vaccine 0-64  Aged Out       Physical Exam   General:  AAO x3, no acute distress.  Eyes:  EOMI PERRLA  Ears/Nose/Mouth/Throat:  Moist mucous " membranes  Respiratory:  CTA bilaterally, no wheezes rales or rhonchi  Cardiovascular:  RRR no murmur rubs or gallops  Gastrointestinal:  Abdomen soft nondistended nontender bowel sounds present x4 quadrants  Musculoskeletal:  Moves all 4 extremities spontaneously, no apparent weakness  Skin:  Warm, dry, no skin rashes or lesions  Neurologic:  AAO x3, cranial nerves 2-12 grossly intact, no focal neuro deficits  Psychiatric:  Normal mood and affect      Result Review :  The following data was reviewed by: Sergio Loya MD on 11/09/2023:      Procedures        Assessment and Plan   Diagnoses and all orders for this visit:    1. Hydradenitis (Primary)  -     clindamycin (Cleocin-T) 1 % external solution; Apply  topically to the appropriate area as directed 2 (Two) Times a Day.  Dispense: 60 mL; Refill: 1  -     Ambulatory Referral to Dermatology    2. Overweight (BMI 25.0-29.9)    3. Overweight with body mass index (BMI) of 28 to 28.9 in adult  -     phentermine 37.5 MG capsule; Take 1 capsule by mouth Every Morning.  Dispense: 30 capsule; Refill: 2      For medications, please see above.            FOLLOW UP  No follow-ups on file.  Patient was given instructions and counseling regarding her condition or for health maintenance advice. Please see specific information pulled into the AVS if appropriate.       Sergio Loya MD  11/09/23  14:33 EST    CURRENT & DISCONTINUED MEDICATIONS  Current Outpatient Medications   Medication Instructions    clindamycin (Cleocin-T) 1 % external solution Topical, 2 Times Daily    phentermine 37.5 mg, Oral, Every Morning       Medications Discontinued During This Encounter   Medication Reason    clindamycin (Cleocin-T) 1 % external solution Reorder    phentermine 37.5 MG capsule Reorder

## 2024-02-14 ENCOUNTER — OFFICE VISIT (OUTPATIENT)
Dept: FAMILY MEDICINE CLINIC | Facility: CLINIC | Age: 29
End: 2024-02-14
Payer: COMMERCIAL

## 2024-02-14 VITALS
OXYGEN SATURATION: 99 % | DIASTOLIC BLOOD PRESSURE: 80 MMHG | HEART RATE: 84 BPM | HEIGHT: 64 IN | WEIGHT: 154 LBS | BODY MASS INDEX: 26.29 KG/M2 | TEMPERATURE: 97.8 F | SYSTOLIC BLOOD PRESSURE: 112 MMHG

## 2024-02-14 DIAGNOSIS — E66.3 OVERWEIGHT (BMI 25.0-29.9): Primary | ICD-10-CM

## 2024-02-14 RX ORDER — TOPIRAMATE 25 MG/1
25 TABLET ORAL DAILY
Qty: 30 TABLET | Refills: 0 | Status: SHIPPED | OUTPATIENT
Start: 2024-02-14 | End: 2024-03-15

## 2024-03-13 ENCOUNTER — TELEPHONE (OUTPATIENT)
Dept: ONCOLOGY | Facility: HOSPITAL | Age: 29
End: 2024-03-13
Payer: COMMERCIAL

## 2024-03-13 ENCOUNTER — LAB (OUTPATIENT)
Dept: ONCOLOGY | Facility: HOSPITAL | Age: 29
End: 2024-03-13
Payer: COMMERCIAL

## 2024-03-13 DIAGNOSIS — D72.819 LEUKOPENIA, UNSPECIFIED TYPE: Primary | ICD-10-CM

## 2024-03-13 LAB
BASOPHILS # BLD AUTO: 0.02 10*3/MM3 (ref 0–0.2)
BASOPHILS NFR BLD AUTO: 0.5 % (ref 0–1.5)
DEPRECATED RDW RBC AUTO: 41.3 FL (ref 37–54)
EOSINOPHIL # BLD AUTO: 0.08 10*3/MM3 (ref 0–0.4)
EOSINOPHIL NFR BLD AUTO: 2 % (ref 0.3–6.2)
ERYTHROCYTE [DISTWIDTH] IN BLOOD BY AUTOMATED COUNT: 12.5 % (ref 12.3–15.4)
HCT VFR BLD AUTO: 38.4 % (ref 34–46.6)
HGB BLD-MCNC: 12.6 G/DL (ref 12–15.9)
IMM GRANULOCYTES # BLD AUTO: 0 10*3/MM3 (ref 0–0.05)
IMM GRANULOCYTES NFR BLD AUTO: 0 % (ref 0–0.5)
LYMPHOCYTES # BLD AUTO: 1.29 10*3/MM3 (ref 0.7–3.1)
LYMPHOCYTES NFR BLD AUTO: 32.8 % (ref 19.6–45.3)
MCH RBC QN AUTO: 29.4 PG (ref 26.6–33)
MCHC RBC AUTO-ENTMCNC: 32.8 G/DL (ref 31.5–35.7)
MCV RBC AUTO: 89.7 FL (ref 79–97)
MONOCYTES # BLD AUTO: 0.22 10*3/MM3 (ref 0.1–0.9)
MONOCYTES NFR BLD AUTO: 5.6 % (ref 5–12)
NEUTROPHILS NFR BLD AUTO: 2.32 10*3/MM3 (ref 1.7–7)
NEUTROPHILS NFR BLD AUTO: 59.1 % (ref 42.7–76)
PLATELET # BLD AUTO: 183 10*3/MM3 (ref 140–450)
PMV BLD AUTO: 10 FL (ref 6–12)
RBC # BLD AUTO: 4.28 10*6/MM3 (ref 3.77–5.28)
WBC NRBC COR # BLD AUTO: 3.93 10*3/MM3 (ref 3.4–10.8)

## 2024-03-13 PROCEDURE — 36415 COLL VENOUS BLD VENIPUNCTURE: CPT

## 2024-03-13 PROCEDURE — 85025 COMPLETE CBC W/AUTO DIFF WBC: CPT

## 2024-03-13 NOTE — TELEPHONE ENCOUNTER
PATIENT CAME IN THIS MORNING FOR LABS AND INQUIRED ABOUT CHANGING HER FOLLOW UP APPT WITH DR. CABELLO OR DOING A MYCHART VISIT. I HAVE LEFT PATIENT A DETAILED VM LETTING HER KNOW THAT DR. CABELLO OKANNA 'D FOR PATIENT TO HAVE A VIDEO VISIT ON 3/15/24 AT 8AM.

## 2024-03-18 ENCOUNTER — TELEPHONE (OUTPATIENT)
Dept: FAMILY MEDICINE CLINIC | Facility: CLINIC | Age: 29
End: 2024-03-18
Payer: COMMERCIAL

## 2024-03-18 DIAGNOSIS — E66.3 OVERWEIGHT (BMI 25.0-29.9): ICD-10-CM

## 2024-03-18 RX ORDER — TOPIRAMATE 25 MG/1
25 TABLET ORAL DAILY
Qty: 30 TABLET | Refills: 0 | Status: SHIPPED | OUTPATIENT
Start: 2024-03-18 | End: 2024-04-17

## 2024-03-18 NOTE — TELEPHONE ENCOUNTER
Caller: Jhoana Garcia    Relationship: Self    Best call back number:     [phone]     Requested Prescriptions:   Requested Prescriptions      No prescriptions requested or ordered in this encounter       topiramate (Topamax)      Pharmacy where request should be sent: [facility] - [place], [place] - [address] - [phone]  - [phone] FX     Last office visit with prescribing clinician: 2/14/2024   Last telemedicine visit with prescribing clinician: Visit date not found   Next office visit with prescribing clinician: Visit date not found     Additional details provided by patient: PATIENT WAS ADVISED TO CALL AND REQUEST INCREASE OF DOSAGE TO 50 MG.     Does the patient have less than a 3 day supply:  [] Yes  [x] No    Would you like a call back once the refill request has been completed: [] Yes [x] No    If the office needs to give you a call back, can they leave a voicemail: [] Yes [x] No    Bright Watkins Rep   03/18/24 09:41 EDT

## 2024-04-02 ENCOUNTER — OFFICE VISIT (OUTPATIENT)
Dept: ONCOLOGY | Facility: HOSPITAL | Age: 29
End: 2024-04-02
Payer: COMMERCIAL

## 2024-04-02 VITALS
SYSTOLIC BLOOD PRESSURE: 109 MMHG | HEART RATE: 76 BPM | HEIGHT: 64 IN | DIASTOLIC BLOOD PRESSURE: 71 MMHG | OXYGEN SATURATION: 100 % | TEMPERATURE: 97.4 F | BODY MASS INDEX: 27.06 KG/M2 | RESPIRATION RATE: 18 BRPM | WEIGHT: 158.51 LBS

## 2024-04-02 DIAGNOSIS — D72.819 LEUKOPENIA, UNSPECIFIED TYPE: Primary | ICD-10-CM

## 2024-04-02 PROCEDURE — G0463 HOSPITAL OUTPT CLINIC VISIT: HCPCS | Performed by: NURSE PRACTITIONER

## 2024-04-02 RX ORDER — MINOCYCLINE HYDROCHLORIDE 100 MG/1
CAPSULE ORAL
COMMUNITY
Start: 2024-03-27

## 2024-04-02 NOTE — PROGRESS NOTES
Chief Complaint/Reason for Referral:  Leukopenia    Sergio Loya MD Lutfi, Fedwa, MD      Subjective    History of Present Illness    Ms. Jhoana Garcia presents for 1 year follow up for leukopenia. Previously, seen by Dr. Nova a year ago. Negative work up with negative hepatitis C, normal folate and B-12 levels, peripheral smear showed normal WBC morphology. Normal Hemoglobin and platelet counts. Leukopenia dating back to to at least November of 2019 in the range of 2.30 to 3.46 with the ANC of 790 to 1920.     She feels well overall. Denies any recent infections. Does drink alcohol occasionally.     Recent lab work on 3/13/2024 shows normal CBC with WBC of 3.93, normal H/H and normal platelet counts.     She is on topamax currently which can lower the WBC as well.       Oncology/Hematology History    No history exists.       Review of Systems   Constitutional:  Positive for fatigue. Negative for appetite change, diaphoresis, fever, unexpected weight gain and unexpected weight loss.   HENT:  Negative for hearing loss, mouth sores, sore throat, swollen glands, trouble swallowing and voice change.    Eyes:  Negative for blurred vision.   Respiratory:  Negative for cough, shortness of breath and wheezing.    Cardiovascular:  Negative for chest pain and palpitations.   Gastrointestinal:  Negative for abdominal pain, blood in stool, constipation, diarrhea, nausea and vomiting.   Endocrine: Negative for cold intolerance and heat intolerance.   Genitourinary:  Negative for difficulty urinating, dysuria, frequency, hematuria and urinary incontinence.   Musculoskeletal:  Negative for arthralgias, back pain and myalgias.   Skin:  Negative for rash, skin lesions and wound.   Neurological:  Negative for dizziness, seizures, weakness, numbness and headache.   Hematological:  Does not bruise/bleed easily.   Psychiatric/Behavioral:  Negative for depressed mood. The patient is not nervous/anxious.    All other systems  reviewed and are negative.      Current Outpatient Medications on File Prior to Visit   Medication Sig Dispense Refill    minocycline (MINOCIN,DYNACIN) 100 MG capsule TAKE ONE CAPSULE BY MOUTH TWICE DAILY with full glass of water, do not lie down for ONE hour after taking      topiramate (Topamax) 25 MG tablet Take 1 tablet by mouth Daily for 30 doses. 30 tablet 0    clindamycin (Cleocin-T) 1 % external solution Apply  topically to the appropriate area as directed 2 (Two) Times a Day. (Patient not taking: Reported on 2024) 60 mL 1    phentermine 37.5 MG capsule Take 1 capsule by mouth Every Morning. (Patient not taking: Reported on 2024) 30 capsule 2     No current facility-administered medications on file prior to visit.       Allergies   Allergen Reactions    Penicillins Hives    Sulfa Antibiotics Hives    Septra [Sulfamethoxazole-Trimethoprim] Hives    Adhesive Tape Rash     Had an allergic reaction to adhesive tape placed on upper arm. Developed a large boil.      Past Medical History:   Diagnosis Date    Urogenital trichomoniasis      Past Surgical History:   Procedure Laterality Date    CHOLECYSTECTOMY      TUBAL ABDOMINAL LIGATION      WISDOM TOOTH EXTRACTION       Social History     Socioeconomic History    Marital status: Single   Tobacco Use    Smoking status: Former     Current packs/day: 0.00     Average packs/day: 0.3 packs/day for 5.0 years (1.3 ttl pk-yrs)     Types: Cigarettes     Start date: 2016     Quit date: 2021     Years since quittin.7    Smokeless tobacco: Never   Vaping Use    Vaping status: Every Day    Substances: Nicotine, Flavoring    Devices: Disposable, Pre-filled or refillable cartridge    Passive vaping exposure: Yes   Substance and Sexual Activity    Alcohol use: Yes     Comment: rarely    Drug use: Never    Sexual activity: Yes     Partners: Male     Birth control/protection: Condom, Tubal ligation     Family History   Problem Relation Age of Onset    Diabetes  Mother     Hypertension Mother     Diabetes Father     Cancer Father     Cancer Paternal Grandmother     Cancer Paternal Grandfather     Breast cancer Neg Hx     Ovarian cancer Neg Hx     Uterine cancer Neg Hx     Colon cancer Neg Hx     Melanoma Neg Hx     Pulmonary embolism Neg Hx      Immunization History   Administered Date(s) Administered    COVID-19 (PFIZER) Purple Cap Monovalent 07/03/2021, 07/26/2021    Flu Vaccine Split Quad 11/21/2016    Fluzone (or Fluarix & Flulaval for VFC) >6mos 02/15/2023    Fluzone Quad >6mos (Multi-dose) 11/21/2016    HPV Quadrivalent 11/21/2016, 01/23/2017, 05/17/2017    Influenza, Unspecified 02/15/2023    MMR 09/25/2019    Tdap 09/25/2019       Tobacco Use: Medium Risk (4/2/2024)    Patient History     Smoking Tobacco Use: Former     Smokeless Tobacco Use: Never     Passive Exposure: Not on file       Objective     Physical Exam  Vitals and nursing note reviewed.   Constitutional:       Appearance: Normal appearance.   HENT:      Head: Normocephalic.      Nose: Nose normal.      Mouth/Throat:      Mouth: Mucous membranes are moist.   Eyes:      Pupils: Pupils are equal, round, and reactive to light.   Cardiovascular:      Rate and Rhythm: Normal rate and regular rhythm.      Pulses: Normal pulses.      Heart sounds: Normal heart sounds.   Pulmonary:      Effort: Pulmonary effort is normal. No respiratory distress.      Breath sounds: Normal breath sounds.   Abdominal:      General: Bowel sounds are normal. There is no distension.      Palpations: Abdomen is soft.   Musculoskeletal:         General: Normal range of motion.      Cervical back: Normal range of motion and neck supple.   Skin:     General: Skin is warm and dry.      Capillary Refill: Capillary refill takes less than 2 seconds.   Neurological:      General: No focal deficit present.      Mental Status: She is alert and oriented to person, place, and time.   Psychiatric:         Mood and Affect: Mood normal.          "Behavior: Behavior normal.         Thought Content: Thought content normal.         Judgment: Judgment normal.         Vitals:    04/02/24 0954   BP: 109/71   Pulse: 76   Resp: 18   Temp: 97.4 °F (36.3 °C)   SpO2: 100%   Weight: 71.9 kg (158 lb 8.2 oz)   Height: 162.6 cm (64.02\")   PainSc: 0-No pain       Wt Readings from Last 3 Encounters:   04/02/24 71.9 kg (158 lb 8.2 oz)   02/14/24 69.9 kg (154 lb)   11/09/23 74.8 kg (165 lb)           ECOG score: 0         ECOG: (0) Fully Active - Able to Carry On All Pre-disease Performance Without Restriction  Fall Risk Assessment was completed, and patient is at low risk for falls.  PHQ-9 Total Score: 0       The patient is  experiencing fatigue. Fatigue score: 5    PT/OT Functional Screening: PT fx screen : No needs identified  Speech Functional Screening: Speech fx screen : No needs identified  Rehab to be ordered: Rehab to be ordered : No needs identified        Result Review :  The following data was reviewed by: DIMITRI Jain on 04/02/2024:  Lab Results   Component Value Date    HGB 12.6 03/13/2024    HCT 38.4 03/13/2024    MCV 89.7 03/13/2024     03/13/2024    WBC 3.93 03/13/2024    NEUTROABS 2.32 03/13/2024    LYMPHSABS 1.29 03/13/2024    MONOSABS 0.22 03/13/2024    EOSABS 0.08 03/13/2024    BASOSABS 0.02 03/13/2024     Lab Results   Component Value Date    GLUCOSE 69 02/15/2023    BUN 13 02/15/2023    CREATININE 0.81 02/15/2023     02/15/2023    K 4.4 02/15/2023     02/15/2023    CO2 26.0 02/15/2023    CALCIUM 9.2 02/15/2023    PROTEINTOT 7.1 02/15/2023    ALBUMIN 4.3 02/15/2023    BILITOT 0.6 02/15/2023    ALKPHOS 49 02/15/2023    AST 18 02/15/2023    ALT 18 02/15/2023     Lab Results   Component Value Date     03/16/2023    [id number] 380 03/16/2023    FOLATE 15.00 03/16/2023     No results found for: \"IRON\", \"LABIRON\", \"TRANSFERRIN\", \"TIBC\"  Lab Results   Component Value Date     03/16/2023    [id number] 380 " "03/16/2023    FOLATE 15.00 03/16/2023     No results found for: \"PSA\", \"CEA\", \"AFP\", \"\", \"\"    No Images in the past 120 days found..         Assessment and Plan:  Diagnoses and all orders for this visit:    1. Leukopenia, unspecified type (Primary)  -     CBC & Differential; Future      Leukopenia dating back to at least November of 2019. Likely benign etiology. Peripheral smear normal in March of 2023. Current CBC is normalized with no evidence of leukopenia or neutropenia.     Follow up in 1 year with repeat CBC.     Can discharge at the next visit if normal.         Patient Follow Up: 1 year with CBC prior to visit.     Patient was given instructions and counseling regarding her condition or for health maintenance advice. Please see specific information pulled into the AVS if appropriate.     Marisa Vela, APRN    4/2/2024    .tob      "

## 2024-05-08 ENCOUNTER — OFFICE VISIT (OUTPATIENT)
Dept: FAMILY MEDICINE CLINIC | Facility: CLINIC | Age: 29
End: 2024-05-08
Payer: COMMERCIAL

## 2024-05-08 VITALS
HEIGHT: 64 IN | WEIGHT: 162 LBS | BODY MASS INDEX: 27.66 KG/M2 | OXYGEN SATURATION: 98 % | SYSTOLIC BLOOD PRESSURE: 100 MMHG | DIASTOLIC BLOOD PRESSURE: 60 MMHG | HEART RATE: 86 BPM | TEMPERATURE: 97.5 F

## 2024-05-08 DIAGNOSIS — E66.3 OVERWEIGHT WITH BODY MASS INDEX (BMI) OF 28 TO 28.9 IN ADULT: Primary | ICD-10-CM

## 2024-05-08 DIAGNOSIS — R63.4 WEIGHT LOSS: ICD-10-CM

## 2024-05-08 DIAGNOSIS — E66.3 OVERWEIGHT (BMI 25.0-29.9): ICD-10-CM

## 2024-05-08 DIAGNOSIS — Z51.81 MEDICATION MONITORING ENCOUNTER: ICD-10-CM

## 2024-05-08 PROCEDURE — 99214 OFFICE O/P EST MOD 30 MIN: CPT | Performed by: STUDENT IN AN ORGANIZED HEALTH CARE EDUCATION/TRAINING PROGRAM

## 2024-05-08 RX ORDER — PHENTERMINE HYDROCHLORIDE 37.5 MG/1
37.5 TABLET ORAL
Qty: 90 TABLET | Refills: 0 | Status: SHIPPED | OUTPATIENT
Start: 2024-05-08 | End: 2024-08-06

## 2024-07-09 ENCOUNTER — OFFICE VISIT (OUTPATIENT)
Dept: FAMILY MEDICINE CLINIC | Facility: CLINIC | Age: 29
End: 2024-07-09
Payer: COMMERCIAL

## 2024-07-09 ENCOUNTER — LAB (OUTPATIENT)
Dept: LAB | Facility: HOSPITAL | Age: 29
End: 2024-07-09
Payer: COMMERCIAL

## 2024-07-09 VITALS
BODY MASS INDEX: 26.46 KG/M2 | OXYGEN SATURATION: 98 % | HEART RATE: 76 BPM | HEIGHT: 64 IN | WEIGHT: 155 LBS | TEMPERATURE: 97.3 F

## 2024-07-09 DIAGNOSIS — L73.2 HYDRADENITIS: ICD-10-CM

## 2024-07-09 DIAGNOSIS — Z00.00 ANNUAL PHYSICAL EXAM: Primary | ICD-10-CM

## 2024-07-09 DIAGNOSIS — E66.3 OVERWEIGHT WITH BODY MASS INDEX (BMI) OF 28 TO 28.9 IN ADULT: ICD-10-CM

## 2024-07-09 LAB
ALBUMIN SERPL-MCNC: 4.4 G/DL (ref 3.5–5.2)
ALBUMIN/GLOB SERPL: 1.5 G/DL
ALP SERPL-CCNC: 53 U/L (ref 39–117)
ALT SERPL W P-5'-P-CCNC: 15 U/L (ref 1–33)
ANION GAP SERPL CALCULATED.3IONS-SCNC: 11.3 MMOL/L (ref 5–15)
AST SERPL-CCNC: 22 U/L (ref 1–32)
BASOPHILS # BLD AUTO: 0.02 10*3/MM3 (ref 0–0.2)
BASOPHILS NFR BLD AUTO: 0.6 % (ref 0–1.5)
BILIRUB SERPL-MCNC: 1 MG/DL (ref 0–1.2)
BUN SERPL-MCNC: 9 MG/DL (ref 6–20)
BUN/CREAT SERPL: 12 (ref 7–25)
CALCIUM SPEC-SCNC: 8.9 MG/DL (ref 8.6–10.5)
CHLORIDE SERPL-SCNC: 101 MMOL/L (ref 98–107)
CHOLEST SERPL-MCNC: 158 MG/DL (ref 0–200)
CO2 SERPL-SCNC: 24.7 MMOL/L (ref 22–29)
CREAT SERPL-MCNC: 0.75 MG/DL (ref 0.57–1)
DEPRECATED RDW RBC AUTO: 41.3 FL (ref 37–54)
EGFRCR SERPLBLD CKD-EPI 2021: 111.4 ML/MIN/1.73
EOSINOPHIL # BLD AUTO: 0.13 10*3/MM3 (ref 0–0.4)
EOSINOPHIL NFR BLD AUTO: 4 % (ref 0.3–6.2)
ERYTHROCYTE [DISTWIDTH] IN BLOOD BY AUTOMATED COUNT: 12.6 % (ref 12.3–15.4)
GLOBULIN UR ELPH-MCNC: 3 GM/DL
GLUCOSE SERPL-MCNC: 75 MG/DL (ref 65–99)
HBA1C MFR BLD: 5.1 % (ref 4.8–5.6)
HCT VFR BLD AUTO: 39.5 % (ref 34–46.6)
HDLC SERPL-MCNC: 83 MG/DL (ref 40–60)
HGB BLD-MCNC: 13.1 G/DL (ref 12–15.9)
IMM GRANULOCYTES # BLD AUTO: 0 10*3/MM3 (ref 0–0.05)
IMM GRANULOCYTES NFR BLD AUTO: 0 % (ref 0–0.5)
LDLC SERPL CALC-MCNC: 63 MG/DL (ref 0–100)
LDLC/HDLC SERPL: 0.77 {RATIO}
LYMPHOCYTES # BLD AUTO: 1.27 10*3/MM3 (ref 0.7–3.1)
LYMPHOCYTES NFR BLD AUTO: 39.3 % (ref 19.6–45.3)
MCH RBC QN AUTO: 29.5 PG (ref 26.6–33)
MCHC RBC AUTO-ENTMCNC: 33.2 G/DL (ref 31.5–35.7)
MCV RBC AUTO: 89 FL (ref 79–97)
MONOCYTES # BLD AUTO: 0.23 10*3/MM3 (ref 0.1–0.9)
MONOCYTES NFR BLD AUTO: 7.1 % (ref 5–12)
NEUTROPHILS NFR BLD AUTO: 1.58 10*3/MM3 (ref 1.7–7)
NEUTROPHILS NFR BLD AUTO: 49 % (ref 42.7–76)
NRBC BLD AUTO-RTO: 0 /100 WBC (ref 0–0.2)
PLATELET # BLD AUTO: 186 10*3/MM3 (ref 140–450)
PMV BLD AUTO: 10.5 FL (ref 6–12)
POTASSIUM SERPL-SCNC: 3.8 MMOL/L (ref 3.5–5.2)
PROT SERPL-MCNC: 7.4 G/DL (ref 6–8.5)
RBC # BLD AUTO: 4.44 10*6/MM3 (ref 3.77–5.28)
SODIUM SERPL-SCNC: 137 MMOL/L (ref 136–145)
TRIGL SERPL-MCNC: 57 MG/DL (ref 0–150)
TSH SERPL DL<=0.05 MIU/L-ACNC: 1.55 UIU/ML (ref 0.27–4.2)
VLDLC SERPL-MCNC: 12 MG/DL (ref 5–40)
WBC NRBC COR # BLD AUTO: 3.23 10*3/MM3 (ref 3.4–10.8)

## 2024-07-09 PROCEDURE — 99395 PREV VISIT EST AGE 18-39: CPT | Performed by: STUDENT IN AN ORGANIZED HEALTH CARE EDUCATION/TRAINING PROGRAM

## 2024-07-09 PROCEDURE — 80061 LIPID PANEL: CPT | Performed by: STUDENT IN AN ORGANIZED HEALTH CARE EDUCATION/TRAINING PROGRAM

## 2024-07-09 PROCEDURE — 83036 HEMOGLOBIN GLYCOSYLATED A1C: CPT

## 2024-07-09 PROCEDURE — 80050 GENERAL HEALTH PANEL: CPT | Performed by: STUDENT IN AN ORGANIZED HEALTH CARE EDUCATION/TRAINING PROGRAM

## 2024-07-09 PROCEDURE — 36415 COLL VENOUS BLD VENIPUNCTURE: CPT

## 2024-07-09 RX ORDER — SEMAGLUTIDE 0.5 MG/.5ML
0.5 INJECTION, SOLUTION SUBCUTANEOUS WEEKLY
Qty: 2 ML | Refills: 0 | Status: SHIPPED | OUTPATIENT
Start: 2024-07-09

## 2024-07-09 RX ORDER — HYDROQUINONE 40 MG/G
CREAM TOPICAL
COMMUNITY
Start: 2024-05-22

## 2024-07-09 RX ORDER — AZELAIC ACID 0.15 G/G
GEL TOPICAL
COMMUNITY
Start: 2024-05-22

## 2024-07-09 NOTE — PROGRESS NOTES
"Chief Complaint  Weight Loss    Subjective      History of Present Illness    Jhoana Garcia is a 28 y.o. female who presents to [facility]   With history of obesity, hidradenitis presents for annual physical today.  She states she continues to have trouble with weight loss.  Currently taking Retin-A and hydroquinone cream she was seen by dermatology.  Patient would like to start a GLP-1 today.      Objective   Vital Signs:   Vitals:    07/09/24 0928   Pulse: 76   Temp: 97.3 °F (36.3 °C)   SpO2: 98%   Weight: 70.3 kg (155 lb)   Height: 162.6 cm (64.02\")     Body mass index is 26.59 kg/m².    Wt Readings from Last 3 Encounters:   07/09/24 70.3 kg (155 lb)   05/08/24 73.5 kg (162 lb)   04/02/24 71.9 kg (158 lb 8.2 oz)     BP Readings from Last 3 Encounters:   05/08/24 100/60   04/02/24 109/71   02/14/24 112/80       Health Maintenance   Topic Date Due    COVID-19 Vaccine (3 - 2023-24 season) 09/01/2023    INFLUENZA VACCINE  08/01/2024    PAP SMEAR  06/09/2025    ANNUAL PHYSICAL  07/09/2025    BMI FOLLOWUP  07/09/2025    TDAP/TD VACCINES (2 - Td or Tdap) 09/25/2029    HEPATITIS C SCREENING  Completed    Pneumococcal Vaccine 0-64  Aged Out       Physical Exam   General:  AAO x3, no acute distress.  Eyes:  EOMI, PERRLA  Ears/Nose/Mouth/Throat:  Moist mucous membranes  Respiratory:  CTA bilaterally, no wheezes rales or rhonchi  Cardiovascular:  RRR no murmur rubs or gallops  Gastrointestinal:  Abdomen soft nondistended nontender bowel sounds present x4 quadrants  Musculoskeletal:  Moves all 4 extremities spontaneously, no apparent weakness  Skin:  Warm, dry, no skin rashes or lesions  Neurologic:  AAO x3, cranial nerves 2-12 grossly intact, no focal neuro deficits  Psychiatric:  Normal mood and affect    Result Review :     The following data was reviewed by: Sergio Loya MD on 07/09/2024:      Procedures          Diagnoses and all orders for this visit:    1. Annual physical exam " (Primary)  -     Comprehensive Metabolic Panel  -     CBC & Differential  -     TSH  -     Lipid Panel    2. Overweight with body mass index (BMI) of 28 to 28.9 in adult  -     Semaglutide-Weight Management (Wegovy) 0.5 MG/0.5ML solution auto-injector; Inject 0.5 mL under the skin into the appropriate area as directed 1 (One) Time Per Week.  Dispense: 2 mL; Refill: 0    3. Hydradenitis  -     Hemoglobin A1c; Future     Will check labs     Pt has tried diet and excercise without success.  Pt does not have history of pancreatitis, or MEN syndromes.  Pt is Not able to tolerate phentermine due to history of DMITRY.    We discussed possible Nausea as a side effect.    FOLLOW UP  No follow-ups on file.  Patient was given instructions and counseling regarding her condition or for health maintenance advice. Please see specific information pulled into the AVS if appropriate.       Sergio Loya MD  07/09/24  11:08 EDT    CURRENT & DISCONTINUED MEDICATIONS  Current Outpatient Medications   Medication Instructions    azelaic acid (AZELEX) 15 % gel apply to the face EVERY MORNING    clindamycin (Cleocin-T) 1 % external solution Topical, 2 Times Daily    hydroquinone 4 % cream apply TWICE DAILY to dark patches for 12 weeks, discontinue if no improvement    minocycline (MINOCIN,DYNACIN) 100 MG capsule TAKE ONE CAPSULE BY MOUTH TWICE DAILY with full glass of water, do not lie down for ONE hour after taking    phentermine (ADIPEX-P) 37.5 mg, Oral, Every Morning Before Breakfast    topiramate (TOPAMAX) 25 mg, Oral, Daily    tretinoin (RETIN-A) 0.025 % cream apply a thin layer to face every NIGHT    Wegovy 0.5 mg, Subcutaneous, Weekly       There are no discontinued medications.

## 2024-09-30 PROBLEM — R30.0 DYSURIA: Status: ACTIVE | Noted: 2024-09-30

## 2024-09-30 PROCEDURE — 87660 TRICHOMONAS VAGIN DIR PROBE: CPT | Performed by: NURSE PRACTITIONER

## 2024-09-30 PROCEDURE — 87480 CANDIDA DNA DIR PROBE: CPT | Performed by: NURSE PRACTITIONER

## 2024-09-30 PROCEDURE — 87491 CHLMYD TRACH DNA AMP PROBE: CPT | Performed by: NURSE PRACTITIONER

## 2024-09-30 PROCEDURE — 87591 N.GONORRHOEAE DNA AMP PROB: CPT | Performed by: NURSE PRACTITIONER

## 2024-09-30 PROCEDURE — 87510 GARDNER VAG DNA DIR PROBE: CPT | Performed by: NURSE PRACTITIONER

## 2024-10-01 ENCOUNTER — TELEPHONE (OUTPATIENT)
Dept: URGENT CARE | Facility: CLINIC | Age: 29
End: 2024-10-01
Payer: COMMERCIAL

## 2024-10-01 NOTE — TELEPHONE ENCOUNTER
----- Message from Yuliana Machado sent at 9/30/2024  2:35 PM EDT -----  Please call the patient regarding her abnormal result.    We have received back the vaginal swab test results.  You have tested positive for Gardnerella vaginalis.  To treat this bacterial vaginosis I have sent in a prescription for metronidazole 500 mg.  This is to be taken twice a day for 7 days.  Please avoid all alcohol and/or alcohol-containing products while taking this prescription as it may cause a very painful and serious side effect.  I have sent this prescription to [facility] in [place].    You tested negative for trichomonas and yeast.    We are currently awaiting your other test results.

## 2025-01-15 ENCOUNTER — OFFICE VISIT (OUTPATIENT)
Dept: FAMILY MEDICINE CLINIC | Facility: CLINIC | Age: 30
End: 2025-01-15
Payer: COMMERCIAL

## 2025-01-15 VITALS
WEIGHT: 160 LBS | OXYGEN SATURATION: 99 % | TEMPERATURE: 97.3 F | SYSTOLIC BLOOD PRESSURE: 110 MMHG | DIASTOLIC BLOOD PRESSURE: 76 MMHG | HEIGHT: 64 IN | BODY MASS INDEX: 27.31 KG/M2 | HEART RATE: 87 BPM

## 2025-01-15 DIAGNOSIS — R39.15 URGENCY OF URINATION: ICD-10-CM

## 2025-01-15 DIAGNOSIS — B37.9 YEAST INFECTION: ICD-10-CM

## 2025-01-15 DIAGNOSIS — Z79.899 MEDICATION MANAGEMENT: ICD-10-CM

## 2025-01-15 DIAGNOSIS — N30.00 ACUTE CYSTITIS WITHOUT HEMATURIA: ICD-10-CM

## 2025-01-15 DIAGNOSIS — R30.0 DYSURIA: Primary | ICD-10-CM

## 2025-01-15 LAB
AMPHET+METHAMPHET UR QL: NEGATIVE
AMPHETAMINE INTERNAL CONTROL: NORMAL
AMPHETAMINES UR QL: NEGATIVE
BARBITURATE INTERNAL CONTROL: NORMAL
BARBITURATES UR QL SCN: NEGATIVE
BENZODIAZ UR QL SCN: NEGATIVE
BENZODIAZEPINE INTERNAL CONTROL: NORMAL
BILIRUB BLD-MCNC: NEGATIVE MG/DL
BUPRENORPHINE INTERNAL CONTROL: NORMAL
BUPRENORPHINE SERPL-MCNC: NEGATIVE NG/ML
CANNABINOIDS SERPL QL: NEGATIVE
CLARITY, POC: CLEAR
COCAINE INTERNAL CONTROL: NORMAL
COCAINE UR QL: NEGATIVE
COLOR UR: YELLOW
EXPIRATION DATE: NORMAL
GLUCOSE UR STRIP-MCNC: NEGATIVE MG/DL
KETONES UR QL: NEGATIVE
LEUKOCYTE EST, POC: NEGATIVE
Lab: NORMAL
MDMA (ECSTASY) INTERNAL CONTROL: NORMAL
MDMA UR QL SCN: NEGATIVE
METHADONE INTERNAL CONTROL: NORMAL
METHADONE UR QL SCN: NEGATIVE
METHAMPHETAMINE INTERNAL CONTROL: NORMAL
MORPHINE INTERNAL CONTROL: NORMAL
MORPHINE/OPIATES SCREEN, URINE: NEGATIVE
NITRITE UR-MCNC: NEGATIVE MG/ML
OXYCODONE INTERNAL CONTROL: NORMAL
OXYCODONE UR QL SCN: NEGATIVE
PCP UR QL SCN: NEGATIVE
PH UR: 7 [PH] (ref 5–8)
PHENCYCLIDINE INTERNAL CONTROL: NORMAL
PROT UR STRIP-MCNC: NEGATIVE MG/DL
RBC # UR STRIP: NEGATIVE /UL
SP GR UR: 1.02 (ref 1–1.03)
THC INTERNAL CONTROL: NORMAL
UROBILINOGEN UR QL: NORMAL

## 2025-01-15 PROCEDURE — 99214 OFFICE O/P EST MOD 30 MIN: CPT | Performed by: STUDENT IN AN ORGANIZED HEALTH CARE EDUCATION/TRAINING PROGRAM

## 2025-01-15 PROCEDURE — 81002 URINALYSIS NONAUTO W/O SCOPE: CPT | Performed by: STUDENT IN AN ORGANIZED HEALTH CARE EDUCATION/TRAINING PROGRAM

## 2025-01-15 RX ORDER — PHENTERMINE HYDROCHLORIDE 37.5 MG/1
37.5 TABLET ORAL
Qty: 90 TABLET | Refills: 0 | Status: SHIPPED | OUTPATIENT
Start: 2025-01-15 | End: 2025-04-15

## 2025-01-15 RX ORDER — FLUCONAZOLE 150 MG/1
150 TABLET ORAL ONCE
Qty: 2 TABLET | Refills: 0 | Status: SHIPPED | OUTPATIENT
Start: 2025-01-15 | End: 2025-01-15

## 2025-01-15 RX ORDER — NITROFURANTOIN 25; 75 MG/1; MG/1
100 CAPSULE ORAL 2 TIMES DAILY
Qty: 10 CAPSULE | Refills: 0 | Status: SHIPPED | OUTPATIENT
Start: 2025-01-15 | End: 2025-01-15

## 2025-01-15 NOTE — PROGRESS NOTES
"Chief Complaint  Weight Loss (Start back on phentermine)    Subjective          Jhoana Garcia presents to [facility]  Weight Loss        History of Present Illness  The patient presents for evaluation of weight management and suspected urinary tract infection.    She has experienced a weight gain of 7 pounds, which she attributes to the holiday season. She was unable to obtain Wegovy due to insurance coverage issues. She expresses a desire to lose approximately 20 pounds per month but acknowledges the difficulty in achieving this goal. She admits to struggling with dietary control, often succumbing to cravings and overeating. She discontinued the use of phentermine in August 2024.    She reports experiencing urinary frequency, necessitating urination approximately every 45 minutes. This symptom began on Sunday. She does not experience any associated pain or pressure during urination. She also reports an absence of vaginal discharge but notes a sensation of dryness. There is no reported malodor. She does report mild pruritus.    SOCIAL HISTORY  She is currently employed at [employer].    MEDICATIONS  Past: Phentermine      Current Outpatient Medications   Medication Instructions    azelaic acid (AZELEX) 15 % gel apply to the face EVERY MORNING    fluconazole (DIFLUCAN) 150 mg, Oral, Once, Repeat dose in 72 hours    phentermine (ADIPEX-P) 37.5 mg, Oral, Every Morning Before Breakfast    tretinoin (RETIN-A) 0.025 % cream apply a thin layer to face every NIGHT    Wegovy 0.5 mg, Subcutaneous, Weekly       The following portions of the patient's history were reviewed and updated as appropriate: allergies, current medications, past family history, past medical history, past social history, past surgical history, and problem list.    Objective   Vital Signs:   /76   Pulse 87   Temp 97.3 °F (36.3 °C)   Ht 162.6 cm (64\")   Wt 72.6 kg (160 lb)   SpO2 99%   BMI 27.46 kg/m²     BP " Readings from Last 3 Encounters:   01/15/25 110/76   09/30/24 110/69   05/08/24 100/60     Wt Readings from Last 3 Encounters:   01/15/25 72.6 kg (160 lb)   09/30/24 69.4 kg (153 lb)   07/09/24 70.3 kg (155 lb)           Physical Exam  Constitutional:       Appearance: Normal appearance.   HENT:      Head: Normocephalic.      Nose: Nose normal.      Mouth/Throat:      Mouth: Mucous membranes are moist.   Eyes:      Pupils: Pupils are equal, round, and reactive to light.   Cardiovascular:      Rate and Rhythm: Normal rate and regular rhythm.   Pulmonary:      Effort: Pulmonary effort is normal.   Abdominal:      General: Abdomen is flat.   Musculoskeletal:         General: Normal range of motion.      Cervical back: Normal range of motion.   Skin:     General: Skin is warm and dry.   Neurological:      General: No focal deficit present.      Mental Status: She is alert.   Psychiatric:         Mood and Affect: Mood normal.         Thought Content: Thought content normal.              Result Review :   The following data was reviewed by: Sergio Loya MD on 01/15/2025:  Common labs          3/13/2024    08:32 7/9/2024    10:38   Common Labs   Glucose  75    BUN  9    Creatinine  0.75    Sodium  137    Potassium  3.8    Chloride  101    Calcium  8.9    Albumin  4.4    Total Bilirubin  1.0    Alkaline Phosphatase  53    AST (SGOT)  22    ALT (SGPT)  15    WBC 3.93  3.23    Hemoglobin 12.6  13.1    Hematocrit 38.4  39.5    Platelets 183  186    Total Cholesterol  158    Triglycerides  57    HDL Cholesterol  83    LDL Cholesterol   63    Hemoglobin A1C  5.10             Lab Results   Component Value Date    COVID19 DETECTED (A) 07/21/2020    POCPREGUR Negative 01/27/2023    BILIRUBINUR Negative 01/15/2025       Procedures        Assessment and Plan    Diagnoses and all orders for this visit:    1. Dysuria (Primary)  -     POCT urinalysis dipstick, manual    2. Medication management  -     POC Medline 12 Panel Urine Drug  Screen    3. BMI 27.0-27.9,adult  -     phentermine (Adipex-P) 37.5 MG tablet; Take 1 tablet by mouth Every Morning Before Breakfast for 90 days.  Dispense: 90 tablet; Refill: 0    4. Urgency of urination    5. Acute cystitis without hematuria  -     Discontinue: nitrofurantoin, macrocrystal-monohydrate, (Macrobid) 100 MG capsule; Take 1 capsule by mouth 2 (Two) Times a Day for 5 days.  Dispense: 10 capsule; Refill: 0    6. Yeast infection  -     fluconazole (Diflucan) 150 MG tablet; Take 1 tablet by mouth 1 (One) Time for 1 dose. Repeat dose in 72 hours  Dispense: 2 tablet; Refill: 0          Assessment & Plan  1. Weight management.  She has gained 7 pounds since her last visit. She was informed about the availability of weight loss medications through compounding pharmacies, which are not FDA approved but are used by weight loss clinics. These medications cost approximately $ 200 per month. She was advised to be mindful of her diet and consider using a . A prescription for phentermine will be sent to [facility].    2. Suspected urinary tract infection.  She reports frequent urination every 45 minutes since Sunday but does not experience pain, pressure, or discharge. The urine dipstick test is normal.    3. Yeast infection.  She reports dryness and mild itchiness without pain or discharge. A prescription for Diflucan 150 mg will be sent to [facility]. She is advised to take one dose today and repeat it in 72 hours. She was cautioned against consuming alcohol while on Diflucan.      Medications Discontinued During This Encounter   Medication Reason    hydroquinone 4 % cream     nitrofurantoin, macrocrystal-monohydrate, (Macrobid) 100 MG capsule           Follow Up   No follow-ups on file.  Patient was given instructions and counseling regarding her condition or for health maintenance advice. Please see specific information pulled into the AVS if appropriate.       Sergio Loya,  MD  01/15/25  08:55 EST      Patient or patient representative verbalized consent for the use of Ambient Listening during the visit with  Sergio Loya MD for chart documentation. 1/15/2025  08:41 EST

## 2025-01-17 PROCEDURE — 87510 GARDNER VAG DNA DIR PROBE: CPT | Performed by: NURSE PRACTITIONER

## 2025-01-17 PROCEDURE — 87491 CHLMYD TRACH DNA AMP PROBE: CPT | Performed by: NURSE PRACTITIONER

## 2025-01-17 PROCEDURE — 87086 URINE CULTURE/COLONY COUNT: CPT | Performed by: NURSE PRACTITIONER

## 2025-01-17 PROCEDURE — 87480 CANDIDA DNA DIR PROBE: CPT | Performed by: NURSE PRACTITIONER

## 2025-01-17 PROCEDURE — 87591 N.GONORRHOEAE DNA AMP PROB: CPT | Performed by: NURSE PRACTITIONER

## 2025-01-17 PROCEDURE — 87660 TRICHOMONAS VAGIN DIR PROBE: CPT | Performed by: NURSE PRACTITIONER

## 2025-01-19 ENCOUNTER — TELEPHONE (OUTPATIENT)
Dept: URGENT CARE | Facility: CLINIC | Age: 30
End: 2025-01-19
Payer: COMMERCIAL

## 2025-01-19 NOTE — TELEPHONE ENCOUNTER
----- Message from Yuliana Machado sent at 1/18/2025  3:59 PM EST -----  Please call the patient regarding her abnormal result.    Please inform the patient that she is positive for bacterial vaginosis.  I have sent in a prescription for Flagyl also known as metronidazole to be taken twice a day for 7 days.  I sent this into the [facility] by the [facility] in [place].    It is important to know to avoid all alcohol and alcohol-containing products while taking this medication.

## 2025-03-31 ENCOUNTER — LAB (OUTPATIENT)
Dept: ONCOLOGY | Facility: HOSPITAL | Age: 30
End: 2025-03-31
Payer: COMMERCIAL

## 2025-03-31 DIAGNOSIS — D72.819 LEUKOPENIA, UNSPECIFIED TYPE: ICD-10-CM

## 2025-03-31 LAB
BASOPHILS # BLD AUTO: 0.02 10*3/MM3 (ref 0–0.2)
BASOPHILS NFR BLD AUTO: 0.7 % (ref 0–1.5)
DEPRECATED RDW RBC AUTO: 40.4 FL (ref 37–54)
EOSINOPHIL # BLD AUTO: 0.18 10*3/MM3 (ref 0–0.4)
EOSINOPHIL NFR BLD AUTO: 6.1 % (ref 0.3–6.2)
ERYTHROCYTE [DISTWIDTH] IN BLOOD BY AUTOMATED COUNT: 11.9 % (ref 12.3–15.4)
HCT VFR BLD AUTO: 39.9 % (ref 34–46.6)
HGB BLD-MCNC: 13.4 G/DL (ref 12–15.9)
IMM GRANULOCYTES # BLD AUTO: 0.01 10*3/MM3 (ref 0–0.05)
IMM GRANULOCYTES NFR BLD AUTO: 0.3 % (ref 0–0.5)
LYMPHOCYTES # BLD AUTO: 1.25 10*3/MM3 (ref 0.7–3.1)
LYMPHOCYTES NFR BLD AUTO: 42.5 % (ref 19.6–45.3)
MCH RBC QN AUTO: 30.8 PG (ref 26.6–33)
MCHC RBC AUTO-ENTMCNC: 33.6 G/DL (ref 31.5–35.7)
MCV RBC AUTO: 91.7 FL (ref 79–97)
MONOCYTES # BLD AUTO: 0.24 10*3/MM3 (ref 0.1–0.9)
MONOCYTES NFR BLD AUTO: 8.2 % (ref 5–12)
NEUTROPHILS NFR BLD AUTO: 1.24 10*3/MM3 (ref 1.7–7)
NEUTROPHILS NFR BLD AUTO: 42.2 % (ref 42.7–76)
NRBC BLD AUTO-RTO: 0 /100 WBC (ref 0–0.2)
PLATELET # BLD AUTO: 170 10*3/MM3 (ref 140–450)
PMV BLD AUTO: 10 FL (ref 6–12)
RBC # BLD AUTO: 4.35 10*6/MM3 (ref 3.77–5.28)
WBC NRBC COR # BLD AUTO: 2.94 10*3/MM3 (ref 3.4–10.8)

## 2025-03-31 PROCEDURE — 36415 COLL VENOUS BLD VENIPUNCTURE: CPT

## 2025-03-31 PROCEDURE — 85025 COMPLETE CBC W/AUTO DIFF WBC: CPT

## 2025-04-01 ENCOUNTER — OFFICE VISIT (OUTPATIENT)
Dept: ONCOLOGY | Facility: HOSPITAL | Age: 30
End: 2025-04-01
Payer: COMMERCIAL

## 2025-04-01 VITALS
DIASTOLIC BLOOD PRESSURE: 75 MMHG | BODY MASS INDEX: 27.04 KG/M2 | WEIGHT: 158.4 LBS | SYSTOLIC BLOOD PRESSURE: 116 MMHG | OXYGEN SATURATION: 100 % | RESPIRATION RATE: 16 BRPM | TEMPERATURE: 98.4 F | HEART RATE: 85 BPM | HEIGHT: 64 IN

## 2025-04-01 DIAGNOSIS — D72.819 LEUKOPENIA, UNSPECIFIED TYPE: Primary | ICD-10-CM

## 2025-04-01 PROCEDURE — G0463 HOSPITAL OUTPT CLINIC VISIT: HCPCS | Performed by: NURSE PRACTITIONER

## 2025-04-01 NOTE — PROGRESS NOTES
Chief Complaint/Reason for Referral:  Follow-up ( Leukopenia). 1 year follow up.       Sergio Loya MD Lutfi, Fedwa, MD    Records Obtained:  Records of the patients history including those obtained from [facility] and patient information were reviewed and summarized in detail.    Subjective      History of Present Illness  The patient is a very pleasant 29-year-old -American female who presents for a 1-year follow-up for leukopenia.    She reports no current symptoms such as fevers, chills, aches, or pains. She has not experienced any weight loss. Her menstrual cycles are regular, lasting approximately 4 to 5 days. She does not have any other regular healthcare providers other than her PCP.  She recalls that her white blood cell count was within the normal range during her last visit. She has an upcoming appointment with Dr. Loya next month. She has previously been prescribed vitamin D supplements.    Supplemental Information  She underwent a cholecystectomy in 2019, following an ultrasound of her abdomen conducted by Dr. Gutiérrez in 2019, which revealed a thickened gallbladder. She also mentions that she gave birth to her son prematurely due to elevated bile acid levels.    SOCIAL HISTORY  She drinks wine a few days a week, typically when off work or relaxing at home.    MEDICATIONS  Current: Phentermine.  Discontinued: Topamax.    Results  Laboratory Studies  White blood cell count is slightly low at 2.94,  ANC count is stable at 1240 slightly less than June of 2024 level of 1580.  Vitamin D was low back in 2022. B12 was acceptable 380 in 2022.       Oncology/Hematology History    No history exists.       Review of Systems   Constitutional: Negative.    HENT: Negative.     Eyes: Negative.    Respiratory: Negative.     Cardiovascular: Negative.    Gastrointestinal: Negative.    Endocrine: Negative.    Genitourinary: Negative.    Musculoskeletal: Negative.    Skin: Negative.    Allergic/Immunologic: Negative.     Psychiatric/Behavioral: Negative.     All other systems reviewed and are negative.      Current Outpatient Medications on File Prior to Visit   Medication Sig Dispense Refill    phentermine (Adipex-P) 37.5 MG tablet Take 1 tablet by mouth Every Morning Before Breakfast for 90 days. 90 tablet 0    azelaic acid (AZELEX) 15 % gel apply to the face EVERY MORNING (Patient not taking: Reported on 4/1/2025)      tretinoin (RETIN-A) 0.025 % cream apply a thin layer to face every NIGHT (Patient not taking: Reported on 4/1/2025)       No current facility-administered medications on file prior to visit.       Allergies   Allergen Reactions    Adhesive Tape Rash     Had an allergic reaction to adhesive tape placed on upper arm. Developed a large boil.     Penicillins Hives    Septra [Sulfamethoxazole-Trimethoprim] Hives    Sulfa Antibiotics Hives     Past Medical History:   Diagnosis Date    Urogenital trichomoniasis      Past Surgical History:   Procedure Laterality Date    CHOLECYSTECTOMY      TUBAL ABDOMINAL LIGATION      WISDOM TOOTH EXTRACTION       Social History     Socioeconomic History    Marital status: Single   Tobacco Use    Smoking status: Former     Current packs/day: 0.00     Average packs/day: 0.3 packs/day for 5.0 years (1.3 ttl pk-yrs)     Types: Cigarettes     Start date: 07/2016     Quit date: 07/2021     Years since quitting: 3.7    Smokeless tobacco: Never   Vaping Use    Vaping status: Every Day    Substances: Nicotine, Flavoring    Devices: Disposable, Pre-filled or refillable cartridge    Passive vaping exposure: Yes   Substance and Sexual Activity    Alcohol use: Yes     Comment: rarely    Drug use: Never    Sexual activity: Yes     Partners: Male     Birth control/protection: Condom, Tubal ligation     Family History   Problem Relation Age of Onset    Diabetes Mother     Hypertension Mother     Diabetes Father     Cancer Father     Cancer Paternal Grandmother     Cancer Paternal Grandfather      "Breast cancer Neg Hx     Ovarian cancer Neg Hx     Uterine cancer Neg Hx     Colon cancer Neg Hx     Melanoma Neg Hx     Pulmonary embolism Neg Hx      Immunization History   Administered Date(s) Administered    COVID-19 (PFIZER) Purple Cap Monovalent 07/03/2021, 07/26/2021    Flu Vaccine Split Quad 11/21/2016    Fluzone (or Fluarix & Flulaval for VFC) >6mos 02/15/2023    Fluzone Quad >6mos (Multi-dose) 11/21/2016    HPV Quadrivalent 11/21/2016, 01/23/2017, 05/17/2017    Influenza, Unspecified 02/15/2023    MMR 09/25/2019    Tdap 09/25/2019    influenza Split 10/29/2012       Tobacco Use: Medium Risk (4/1/2025)    Patient History     Smoking Tobacco Use: Former     Smokeless Tobacco Use: Never     Passive Exposure: Not on file       Objective     Physical Exam  Vitals and nursing note reviewed.   Constitutional:       Appearance: Normal appearance.   HENT:      Mouth/Throat:      Mouth: Mucous membranes are moist.   Eyes:      Extraocular Movements: Extraocular movements intact.   Cardiovascular:      Rate and Rhythm: Normal rate.   Pulmonary:      Effort: Pulmonary effort is normal. No respiratory distress.   Musculoskeletal:         General: Normal range of motion.      Cervical back: Normal range of motion and neck supple.   Skin:     General: Skin is warm and dry.   Neurological:      General: No focal deficit present.      Mental Status: She is alert and oriented to person, place, and time.   Psychiatric:         Mood and Affect: Mood normal.         Behavior: Behavior normal.         Thought Content: Thought content normal.         Judgment: Judgment normal.         Vitals:    04/01/25 0857   BP: 116/75   Pulse: 85   Resp: 16   Temp: 98.4 °F (36.9 °C)   TempSrc: Temporal   SpO2: 100%   Weight: 71.8 kg (158 lb 6.4 oz)   Height: 162.6 cm (64\")   PainSc: 0-No pain       Wt Readings from Last 3 Encounters:   04/01/25 71.8 kg (158 lb 6.4 oz)   01/17/25 75 kg (165 lb 4.8 oz)   01/15/25 72.6 kg (160 lb)      ECOG " "score: 0         ECOG: (0) Fully Active - Able to Carry On All Pre-disease Performance Without Restriction  Fall Risk Assessment was completed, and patient is at low risk for falls.  PHQ-9 Total Score:         The patient is not  experiencing fatigue. Fatigue score: 0    PT/OT Functional Screening: PT fx screen : No needs identified  Speech Functional Screening: Speech fx screen : No needs identified  Rehab to be ordered: Rehab to be ordered : No needs identified        Result Review :  The following data was reviewed by: DIMITRI Jain on 04/01/2025:  Lab Results   Component Value Date    HGB 13.4 03/31/2025    HCT 39.9 03/31/2025    MCV 91.7 03/31/2025     03/31/2025    WBC 2.94 (L) 03/31/2025    NEUTROABS 1.24 (L) 03/31/2025    LYMPHSABS 1.25 03/31/2025    MONOSABS 0.24 03/31/2025    EOSABS 0.18 03/31/2025    BASOSABS 0.02 03/31/2025     Lab Results   Component Value Date    GLUCOSE 75 07/09/2024    BUN 9 07/09/2024    CREATININE 0.75 07/09/2024     07/09/2024    K 3.8 07/09/2024     07/09/2024    CO2 24.7 07/09/2024    CALCIUM 8.9 07/09/2024    PROTEINTOT 7.4 07/09/2024    ALBUMIN 4.4 07/09/2024    BILITOT 1.0 07/09/2024    ALKPHOS 53 07/09/2024    AST 22 07/09/2024    ALT 15 07/09/2024     Lab Results   Component Value Date     03/16/2023    Folate 15.00 03/16/2023     No results found for: \"IRON\", \"LABIRON\", \"TRANSFERRIN\", \"TIBC\"  Lab Results   Component Value Date     03/16/2023    WLDHYQKW41 380 03/16/2023    FOLATE 15.00 03/16/2023     No results found for: \"PSA\", \"CEA\", \"AFP\", \"\", \"\"         Assessment and Plan:  Diagnoses and all orders for this visit:    1. Leukopenia, unspecified type (Primary)  -     CBC & Differential; Future  -     Comprehensive Metabolic Panel; Future  -     Vitamin B12; Future  -     Folate; Future        Assessment & Plan  1. Leukopenia: Chronic intermittent present since at least November of 2019. Does not appear to be " worsening.     Previously seen by locums: Dr. Hopper and Dr. Nova. 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   Her white blood cell count remains slightly low, but it is stable and not a cause for immediate concern. The neutrophil count is within normal limits. The leukopenia could be attributed to benign ethnic neutropenia, medication use, or fatty liver disease. She was previously on Topamax, which could have contributed to the condition, but she is no longer taking it. She is currently on phentermine. There are no signs of infection, weight loss, or other concerning symptoms. A bone marrow biopsy is not deemed necessary at this time. She is advised to monitor for any changes in her condition, such as chronic fevers, weight loss, or enlarged lymph nodes. If these symptoms occur, she should seek medical attention promptly.  If leukopenia worsens or symptomatic, then consideration for bone marrow  biopsy. Laboratory tests will be repeated in 1 year to monitor her folate and B12 levels.    2. Vitamin D deficiency.  Her vitamin D levels were low in 2022. She is advised to have her vitamin D levels rechecked during her next visit with Dr. Loya next month.    Follow-up  The patient will follow up in 1 year.    PROCEDURE  The patient underwent a cholecystectomy in 2019.        I spent 25 minutes caring for Jhoana on this date of service. This time includes time spent by me in the following activities:preparing for the visit, reviewing tests, obtaining and/or reviewing a separately obtained history, performing a medically appropriate examination and/or evaluation , counseling and educating the patient/family/caregiver, ordering medications, tests, or procedures, referring and communicating with other health care professionals , documenting information in the medical record, and independently interpreting results and communicating that information with the patient/family/caregiver    Patient Follow Up: 1  year or sooner if needed. CBC in 1 year.       Patient was given instructions and counseling regarding her condition or for health maintenance advice. Please see specific information pulled into the AVS if appropriate.     DIMITRI Jain    4/1/2025    .tob    Patient or patient representative verbalized consent for the use of Ambient Listening during the visit with  DIMITRI Jain for chart documentation. 4/1/2025  09:23 EDT

## 2025-04-15 ENCOUNTER — OFFICE VISIT (OUTPATIENT)
Dept: FAMILY MEDICINE CLINIC | Facility: CLINIC | Age: 30
End: 2025-04-15
Payer: COMMERCIAL

## 2025-04-15 VITALS
SYSTOLIC BLOOD PRESSURE: 110 MMHG | TEMPERATURE: 97.3 F | OXYGEN SATURATION: 99 % | BODY MASS INDEX: 26.29 KG/M2 | DIASTOLIC BLOOD PRESSURE: 80 MMHG | HEART RATE: 89 BPM | HEIGHT: 64 IN | WEIGHT: 154 LBS

## 2025-04-15 DIAGNOSIS — D72.819 LEUKOPENIA, UNSPECIFIED TYPE: ICD-10-CM

## 2025-04-15 DIAGNOSIS — E55.9 VITAMIN D DEFICIENCY: ICD-10-CM

## 2025-04-15 DIAGNOSIS — R11.0 NAUSEA: ICD-10-CM

## 2025-04-15 LAB
25(OH)D3 SERPL-MCNC: 38.7 NG/ML (ref 30–100)
ALBUMIN SERPL-MCNC: 4.2 G/DL (ref 3.5–5.2)
ALBUMIN/GLOB SERPL: 1.3 G/DL
ALP SERPL-CCNC: 49 U/L (ref 39–117)
ALT SERPL W P-5'-P-CCNC: 47 U/L (ref 1–33)
ANION GAP SERPL CALCULATED.3IONS-SCNC: 9.9 MMOL/L (ref 5–15)
AST SERPL-CCNC: 73 U/L (ref 1–32)
BASOPHILS # BLD AUTO: 0.02 10*3/MM3 (ref 0–0.2)
BASOPHILS NFR BLD AUTO: 0.6 % (ref 0–1.5)
BILIRUB SERPL-MCNC: 1.2 MG/DL (ref 0–1.2)
BUN SERPL-MCNC: 13 MG/DL (ref 6–20)
BUN/CREAT SERPL: 15.5 (ref 7–25)
CALCIUM SPEC-SCNC: 9.1 MG/DL (ref 8.6–10.5)
CHLORIDE SERPL-SCNC: 102 MMOL/L (ref 98–107)
CO2 SERPL-SCNC: 26.1 MMOL/L (ref 22–29)
CREAT SERPL-MCNC: 0.84 MG/DL (ref 0.57–1)
DEPRECATED RDW RBC AUTO: 42.7 FL (ref 37–54)
EGFRCR SERPLBLD CKD-EPI 2021: 96.6 ML/MIN/1.73
EOSINOPHIL # BLD AUTO: 0.21 10*3/MM3 (ref 0–0.4)
EOSINOPHIL NFR BLD AUTO: 6.7 % (ref 0.3–6.2)
ERYTHROCYTE [DISTWIDTH] IN BLOOD BY AUTOMATED COUNT: 12.5 % (ref 12.3–15.4)
FOLATE SERPL-MCNC: 18.9 NG/ML (ref 4.78–24.2)
GLOBULIN UR ELPH-MCNC: 3.2 GM/DL
GLUCOSE SERPL-MCNC: 74 MG/DL (ref 65–99)
HCT VFR BLD AUTO: 40.9 % (ref 34–46.6)
HGB BLD-MCNC: 13.5 G/DL (ref 12–15.9)
IMM GRANULOCYTES # BLD AUTO: 0.01 10*3/MM3 (ref 0–0.05)
IMM GRANULOCYTES NFR BLD AUTO: 0.3 % (ref 0–0.5)
LYMPHOCYTES # BLD AUTO: 1.14 10*3/MM3 (ref 0.7–3.1)
LYMPHOCYTES NFR BLD AUTO: 36.2 % (ref 19.6–45.3)
MCH RBC QN AUTO: 30.6 PG (ref 26.6–33)
MCHC RBC AUTO-ENTMCNC: 33 G/DL (ref 31.5–35.7)
MCV RBC AUTO: 92.7 FL (ref 79–97)
MONOCYTES # BLD AUTO: 0.28 10*3/MM3 (ref 0.1–0.9)
MONOCYTES NFR BLD AUTO: 8.9 % (ref 5–12)
NEUTROPHILS NFR BLD AUTO: 1.49 10*3/MM3 (ref 1.7–7)
NEUTROPHILS NFR BLD AUTO: 47.3 % (ref 42.7–76)
NRBC BLD AUTO-RTO: 0 /100 WBC (ref 0–0.2)
PLATELET # BLD AUTO: 185 10*3/MM3 (ref 140–450)
PMV BLD AUTO: 10.8 FL (ref 6–12)
POTASSIUM SERPL-SCNC: 4.3 MMOL/L (ref 3.5–5.2)
PROT SERPL-MCNC: 7.4 G/DL (ref 6–8.5)
RBC # BLD AUTO: 4.41 10*6/MM3 (ref 3.77–5.28)
SODIUM SERPL-SCNC: 138 MMOL/L (ref 136–145)
VIT B12 BLD-MCNC: 827 PG/ML (ref 211–946)
WBC NRBC COR # BLD AUTO: 3.15 10*3/MM3 (ref 3.4–10.8)

## 2025-04-15 PROCEDURE — 82607 VITAMIN B-12: CPT | Performed by: NURSE PRACTITIONER

## 2025-04-15 PROCEDURE — 82746 ASSAY OF FOLIC ACID SERUM: CPT | Performed by: NURSE PRACTITIONER

## 2025-04-15 PROCEDURE — 80053 COMPREHEN METABOLIC PANEL: CPT | Performed by: NURSE PRACTITIONER

## 2025-04-15 PROCEDURE — 82306 VITAMIN D 25 HYDROXY: CPT | Performed by: NURSE PRACTITIONER

## 2025-04-15 PROCEDURE — 85025 COMPLETE CBC W/AUTO DIFF WBC: CPT | Performed by: NURSE PRACTITIONER

## 2025-04-15 RX ORDER — ONDANSETRON 4 MG/1
4 TABLET, ORALLY DISINTEGRATING ORAL EVERY 12 HOURS PRN
Qty: 14 TABLET | Refills: 0 | Status: SHIPPED | OUTPATIENT
Start: 2025-04-15 | End: 2025-04-22

## 2025-04-15 NOTE — PROGRESS NOTES
"Chief Complaint  Weight Loss (WANTS TO TRY USING ALTERNATE PHARMACY FOR WEIGHT LOSS SHOTS [employer] INSURANCE DIDN'T COVER IT.)    Subjective          Jhoana Garcia presents to [facility]  Weight Loss        History of Present Illness  The patient presents for weight management, leukopenia, and vitamin D deficiency.    She has been attending a gym regularly, with 3 sessions in the past 2 weeks. Currently, she is on the verge of completing her Adipex (phentermine) regimen, with only a few days' worth of medication remaining. A weight loss of approximately 11 pounds has been achieved.    A consultation with her hematologist occurred 2 weeks ago due to a slightly decreased white blood cell count. A follow-up appointment is scheduled for next year.    Vitamin D levels were previously found to be low. The hematologist recommended that these levels be checked during her next visit to her primary care provider.    SOCIAL HISTORY  She admits to smoking.      Current Outpatient Medications   Medication Instructions    ondansetron ODT (ZOFRAN-ODT) 4 mg, Translingual, Every 12 Hours PRN    phentermine (ADIPEX-P) 37.5 mg, Oral, Every Morning Before Breakfast    Semaglutide-Weight Management 0.25 mg, Subcutaneous, Weekly       The following portions of the patient's history were reviewed and updated as appropriate: allergies, current medications, past family history, past medical history, past social history, past surgical history, and problem list.    Objective   Vital Signs:   /80   Pulse 89   Temp 97.3 °F (36.3 °C)   Ht 162.6 cm (64\")   Wt 69.9 kg (154 lb)   SpO2 99%   BMI 26.43 kg/m²     BP Readings from Last 3 Encounters:   04/15/25 110/80   04/01/25 116/75   01/17/25 123/84     Wt Readings from Last 3 Encounters:   04/15/25 69.9 kg (154 lb)   04/01/25 71.8 kg (158 lb 6.4 oz)   01/17/25 75 kg (165 lb 4.8 oz)           Physical Exam  Constitutional:       Appearance: Normal appearance. "   HENT:      Head: Normocephalic.      Nose: Nose normal.      Mouth/Throat:      Mouth: Mucous membranes are moist.   Eyes:      Pupils: Pupils are equal, round, and reactive to light.   Cardiovascular:      Rate and Rhythm: Normal rate and regular rhythm.   Pulmonary:      Effort: Pulmonary effort is normal.   Abdominal:      General: Abdomen is flat.   Musculoskeletal:         General: Normal range of motion.      Cervical back: Normal range of motion.   Skin:     General: Skin is warm and dry.   Neurological:      General: No focal deficit present.      Mental Status: She is alert.   Psychiatric:         Mood and Affect: Mood normal.         Thought Content: Thought content normal.              Result Review :   The following data was reviewed by: Sergio Loya MD on 04/15/2025:  Common labs          7/9/2024    10:38 3/31/2025    08:09   Common Labs   Glucose 75     BUN 9     Creatinine 0.75     Sodium 137     Potassium 3.8     Chloride 101     Calcium 8.9     Albumin 4.4     Total Bilirubin 1.0     Alkaline Phosphatase 53     AST (SGOT) 22     ALT (SGPT) 15     WBC 3.23  2.94    Hemoglobin 13.1  13.4    Hematocrit 39.5  39.9    Platelets 186  170    Total Cholesterol 158     Triglycerides 57     HDL Cholesterol 83     LDL Cholesterol  63     Hemoglobin A1C 5.10              Lab Results   Component Value Date    COVID19 DETECTED (A) 07/21/2020    POCPREGUR Negative 01/27/2023    BILIRUBINUR Negative 01/17/2025       Procedures        Assessment and Plan    Diagnoses and all orders for this visit:    1. BMI 26.0-26.9,adult (Primary)  -     Semaglutide-Weight Management 0.25 MG/0.5ML solution auto-injector; Inject 0.5 mL under the skin into the appropriate area as directed 1 (One) Time Per Week.  Dispense: 2 mL; Refill: 0    2. Nausea  -     ondansetron ODT (ZOFRAN-ODT) 4 MG disintegrating tablet; Place 1 tablet on the tongue Every 12 (Twelve) Hours As Needed for Nausea or Vomiting for up to 7 days.  Dispense:  14 tablet; Refill: 0    3. Vitamin D deficiency  -     Vitamin D 25 hydroxy; Future  -     Vitamin D 25 hydroxy    4. Leukopenia, unspecified type  -     CBC & Differential  -     Comprehensive Metabolic Panel  -     Vitamin B12  -     Folate          Assessment & Plan  1. Weight management.  - Body Mass Index (BMI) is currently at 26.  - Potential side effects of Wegovy, including nausea, have been discussed.  - Prescription for Wegovy 0.25 mg has been issued to [facility].  - A 7-day supply of Zofran 4 mg, totaling 14 tablets, has been prescribed to manage potential nausea, with the prescription sent to [facility].    2. Leukopenia.  - Recent visit to hematologist indicated slightly low white blood cell count.  - Complete Blood Count (CBC) test will be conducted today to monitor the condition.    3. Vitamin D deficiency.  - Patient reported a history of low Vitamin D levels.  - A Vitamin D level test will be performed today to assess the current status.      Medications Discontinued During This Encounter   Medication Reason    tretinoin (RETIN-A) 0.025 % cream     azelaic acid (AZELEX) 15 % gel           Follow Up   No follow-ups on file.  Patient was given instructions and counseling regarding her condition or for health maintenance advice. Please see specific information pulled into the AVS if appropriate.       Sergio Loya MD  04/15/25  09:36 EDT      Patient or patient representative verbalized consent for the use of Ambient Listening during the visit with  Sergio Loya MD for chart documentation. 4/15/2025  08:33 EDT

## 2025-05-06 ENCOUNTER — TELEPHONE (OUTPATIENT)
Dept: FAMILY MEDICINE CLINIC | Facility: CLINIC | Age: 30
End: 2025-05-06
Payer: COMMERCIAL

## 2025-05-06 RX ORDER — SEMAGLUTIDE 0.5 MG/.5ML
0.5 INJECTION, SOLUTION SUBCUTANEOUS WEEKLY
Qty: 2 ML | Refills: 0 | Status: SHIPPED | OUTPATIENT
Start: 2025-05-06 | End: 2025-05-06 | Stop reason: SDUPTHER

## 2025-05-06 NOTE — TELEPHONE ENCOUNTER
Caller: MAYTE     Relationship: [facility]     Best call back number:  [phone]     Who are you requesting to speak with (clinical staff, provider,  specific staff member):   CLINICAL     What was the call regarding:  Pharmacy is requesting a return call. Asking if they can have permission to fill pt's prescription for Semaglutide, as a Compound ( Vit B12 ). Please call and advise.

## 2025-05-07 RX ORDER — SEMAGLUTIDE 0.5 MG/.5ML
0.5 INJECTION, SOLUTION SUBCUTANEOUS WEEKLY
Qty: 2 ML | Refills: 0 | Status: SHIPPED | OUTPATIENT
Start: 2025-05-07

## 2025-08-14 ENCOUNTER — OFFICE VISIT (OUTPATIENT)
Dept: OBSTETRICS AND GYNECOLOGY | Age: 30
End: 2025-08-14
Payer: COMMERCIAL

## 2025-08-14 VITALS
DIASTOLIC BLOOD PRESSURE: 76 MMHG | SYSTOLIC BLOOD PRESSURE: 116 MMHG | HEIGHT: 64 IN | WEIGHT: 159 LBS | HEART RATE: 87 BPM | BODY MASS INDEX: 27.14 KG/M2

## 2025-08-14 DIAGNOSIS — Z01.419 WELL WOMAN EXAM WITH ROUTINE GYNECOLOGICAL EXAM: Primary | ICD-10-CM

## 2025-08-14 PROCEDURE — G0123 SCREEN CERV/VAG THIN LAYER: HCPCS | Performed by: OBSTETRICS & GYNECOLOGY

## 2025-08-14 PROCEDURE — 99395 PREV VISIT EST AGE 18-39: CPT | Performed by: OBSTETRICS & GYNECOLOGY

## 2025-08-14 PROCEDURE — 87624 HPV HI-RISK TYP POOLED RSLT: CPT | Performed by: OBSTETRICS & GYNECOLOGY

## 2025-08-17 LAB
CYTOLOGIST CVX/VAG CYTO: ABNORMAL
CYTOLOGY CVX/VAG DOC CYTO: ABNORMAL
CYTOLOGY CVX/VAG DOC THIN PREP: ABNORMAL
DX ICD CODE: ABNORMAL
HPV I/H RISK 4 DNA CVX QL PROBE+SIG AMP: POSITIVE
OTHER STN SPEC: ABNORMAL
SERVICE CMNT-IMP: ABNORMAL
STAT OF ADQ CVX/VAG CYTO-IMP: ABNORMAL

## 2025-08-18 PROCEDURE — 81515 NFCT DS BV&VAGINITIS DNA ALG: CPT | Performed by: EMERGENCY MEDICINE

## 2025-08-28 ENCOUNTER — OFFICE VISIT (OUTPATIENT)
Dept: FAMILY MEDICINE CLINIC | Facility: CLINIC | Age: 30
End: 2025-08-28
Payer: COMMERCIAL

## 2025-08-28 VITALS
HEIGHT: 64 IN | TEMPERATURE: 97.3 F | WEIGHT: 161 LBS | SYSTOLIC BLOOD PRESSURE: 108 MMHG | BODY MASS INDEX: 27.49 KG/M2 | OXYGEN SATURATION: 99 % | DIASTOLIC BLOOD PRESSURE: 60 MMHG | HEART RATE: 65 BPM

## 2025-08-28 DIAGNOSIS — D72.819 LEUKOPENIA, UNSPECIFIED TYPE: ICD-10-CM

## 2025-08-28 DIAGNOSIS — Z00.00 ANNUAL PHYSICAL EXAM: Primary | ICD-10-CM

## 2025-08-28 DIAGNOSIS — E55.9 VITAMIN D DEFICIENCY: ICD-10-CM

## 2025-08-28 LAB
25(OH)D3 SERPL-MCNC: 38.5 NG/ML (ref 30–100)
ALBUMIN SERPL-MCNC: 4 G/DL (ref 3.5–5.2)
ALBUMIN/GLOB SERPL: 1.3 G/DL
ALP SERPL-CCNC: 47 U/L (ref 39–117)
ALT SERPL W P-5'-P-CCNC: 26 U/L (ref 1–33)
ANION GAP SERPL CALCULATED.3IONS-SCNC: 12.6 MMOL/L (ref 5–15)
AST SERPL-CCNC: 26 U/L (ref 1–32)
BASOPHILS # BLD AUTO: 0.03 10*3/MM3 (ref 0–0.2)
BASOPHILS NFR BLD AUTO: 1 % (ref 0–1.5)
BILIRUB SERPL-MCNC: 1 MG/DL (ref 0–1.2)
BUN SERPL-MCNC: 14 MG/DL (ref 6–20)
BUN/CREAT SERPL: 17.3 (ref 7–25)
CALCIUM SPEC-SCNC: 9.2 MG/DL (ref 8.6–10.5)
CHLORIDE SERPL-SCNC: 103 MMOL/L (ref 98–107)
CHOLEST SERPL-MCNC: 164 MG/DL (ref 0–200)
CO2 SERPL-SCNC: 24.4 MMOL/L (ref 22–29)
CREAT SERPL-MCNC: 0.81 MG/DL (ref 0.57–1)
DEPRECATED RDW RBC AUTO: 41.6 FL (ref 37–54)
EGFRCR SERPLBLD CKD-EPI 2021: 100.3 ML/MIN/1.73
EOSINOPHIL # BLD AUTO: 0.14 10*3/MM3 (ref 0–0.4)
EOSINOPHIL NFR BLD AUTO: 4.8 % (ref 0.3–6.2)
ERYTHROCYTE [DISTWIDTH] IN BLOOD BY AUTOMATED COUNT: 12 % (ref 12.3–15.4)
GLOBULIN UR ELPH-MCNC: 3.2 GM/DL
GLUCOSE SERPL-MCNC: 79 MG/DL (ref 65–99)
HCT VFR BLD AUTO: 41.3 % (ref 34–46.6)
HDLC SERPL-MCNC: 82 MG/DL (ref 40–60)
HGB BLD-MCNC: 13.1 G/DL (ref 12–15.9)
IMM GRANULOCYTES # BLD AUTO: 0 10*3/MM3 (ref 0–0.05)
IMM GRANULOCYTES NFR BLD AUTO: 0 % (ref 0–0.5)
LDLC SERPL CALC-MCNC: 68 MG/DL (ref 0–100)
LDLC/HDLC SERPL: 0.83 {RATIO}
LYMPHOCYTES # BLD AUTO: 1.18 10*3/MM3 (ref 0.7–3.1)
LYMPHOCYTES NFR BLD AUTO: 40.7 % (ref 19.6–45.3)
MCH RBC QN AUTO: 29.6 PG (ref 26.6–33)
MCHC RBC AUTO-ENTMCNC: 31.7 G/DL (ref 31.5–35.7)
MCV RBC AUTO: 93.2 FL (ref 79–97)
MONOCYTES # BLD AUTO: 0.26 10*3/MM3 (ref 0.1–0.9)
MONOCYTES NFR BLD AUTO: 9 % (ref 5–12)
NEUTROPHILS NFR BLD AUTO: 1.29 10*3/MM3 (ref 1.7–7)
NEUTROPHILS NFR BLD AUTO: 44.5 % (ref 42.7–76)
NRBC BLD AUTO-RTO: 0 /100 WBC (ref 0–0.2)
PLATELET # BLD AUTO: 155 10*3/MM3 (ref 140–450)
PMV BLD AUTO: 10.6 FL (ref 6–12)
POTASSIUM SERPL-SCNC: 4.1 MMOL/L (ref 3.5–5.2)
PROT SERPL-MCNC: 7.2 G/DL (ref 6–8.5)
RBC # BLD AUTO: 4.43 10*6/MM3 (ref 3.77–5.28)
SODIUM SERPL-SCNC: 140 MMOL/L (ref 136–145)
TRIGL SERPL-MCNC: 71 MG/DL (ref 0–150)
TSH SERPL DL<=0.05 MIU/L-ACNC: 0.79 UIU/ML (ref 0.27–4.2)
VLDLC SERPL-MCNC: 14 MG/DL (ref 5–40)
WBC NRBC COR # BLD AUTO: 2.9 10*3/MM3 (ref 3.4–10.8)

## 2025-08-28 PROCEDURE — 80061 LIPID PANEL: CPT | Performed by: STUDENT IN AN ORGANIZED HEALTH CARE EDUCATION/TRAINING PROGRAM

## 2025-08-28 PROCEDURE — 80053 COMPREHEN METABOLIC PANEL: CPT | Performed by: STUDENT IN AN ORGANIZED HEALTH CARE EDUCATION/TRAINING PROGRAM

## 2025-08-28 PROCEDURE — 84443 ASSAY THYROID STIM HORMONE: CPT | Performed by: STUDENT IN AN ORGANIZED HEALTH CARE EDUCATION/TRAINING PROGRAM

## 2025-08-28 PROCEDURE — 82306 VITAMIN D 25 HYDROXY: CPT | Performed by: STUDENT IN AN ORGANIZED HEALTH CARE EDUCATION/TRAINING PROGRAM

## 2025-08-28 PROCEDURE — 85025 COMPLETE CBC W/AUTO DIFF WBC: CPT | Performed by: STUDENT IN AN ORGANIZED HEALTH CARE EDUCATION/TRAINING PROGRAM

## 2025-08-28 RX ORDER — PHENTERMINE HYDROCHLORIDE 37.5 MG/1
37.5 TABLET ORAL
Qty: 30 TABLET | Refills: 0 | Status: SHIPPED | OUTPATIENT
Start: 2025-08-28 | End: 2025-09-27

## 2025-08-28 RX ORDER — PHENTERMINE HYDROCHLORIDE 37.5 MG/1
37.5 CAPSULE ORAL EVERY MORNING
Status: CANCELLED | OUTPATIENT
Start: 2025-08-28